# Patient Record
Sex: FEMALE | Race: WHITE | HISPANIC OR LATINO | Employment: OTHER | URBAN - METROPOLITAN AREA
[De-identification: names, ages, dates, MRNs, and addresses within clinical notes are randomized per-mention and may not be internally consistent; named-entity substitution may affect disease eponyms.]

---

## 2019-01-30 ENCOUNTER — HOSPITAL ENCOUNTER (EMERGENCY)
Facility: MEDICAL CENTER | Age: 49
End: 2019-01-30
Attending: EMERGENCY MEDICINE
Payer: MEDICAID

## 2019-01-30 ENCOUNTER — APPOINTMENT (OUTPATIENT)
Dept: RADIOLOGY | Facility: MEDICAL CENTER | Age: 49
End: 2019-01-30
Attending: EMERGENCY MEDICINE
Payer: MEDICAID

## 2019-01-30 VITALS
OXYGEN SATURATION: 98 % | SYSTOLIC BLOOD PRESSURE: 102 MMHG | RESPIRATION RATE: 16 BRPM | WEIGHT: 201.72 LBS | DIASTOLIC BLOOD PRESSURE: 78 MMHG | TEMPERATURE: 97.5 F | HEART RATE: 63 BPM

## 2019-01-30 DIAGNOSIS — K59.00 CONSTIPATION, UNSPECIFIED CONSTIPATION TYPE: ICD-10-CM

## 2019-01-30 DIAGNOSIS — N93.8 DYSFUNCTIONAL UTERINE BLEEDING: ICD-10-CM

## 2019-01-30 DIAGNOSIS — D21.9 FIBROIDS: ICD-10-CM

## 2019-01-30 LAB
ALBUMIN SERPL BCP-MCNC: 4.7 G/DL (ref 3.2–4.9)
ALBUMIN/GLOB SERPL: 1.6 G/DL
ALP SERPL-CCNC: 68 U/L (ref 30–99)
ALT SERPL-CCNC: 41 U/L (ref 2–50)
ANION GAP SERPL CALC-SCNC: 7 MMOL/L (ref 0–11.9)
ANISOCYTOSIS BLD QL SMEAR: ABNORMAL
APPEARANCE UR: CLEAR
AST SERPL-CCNC: 33 U/L (ref 12–45)
BACTERIA #/AREA URNS HPF: NEGATIVE /HPF
BASOPHILS # BLD AUTO: 1.3 % (ref 0–1.8)
BASOPHILS # BLD: 0.08 K/UL (ref 0–0.12)
BILIRUB SERPL-MCNC: 0.3 MG/DL (ref 0.1–1.5)
BILIRUB UR QL STRIP.AUTO: NEGATIVE
BUN SERPL-MCNC: 12 MG/DL (ref 8–22)
CALCIUM SERPL-MCNC: 9.9 MG/DL (ref 8.5–10.5)
CHLORIDE SERPL-SCNC: 105 MMOL/L (ref 96–112)
CO2 SERPL-SCNC: 22 MMOL/L (ref 20–33)
COLOR UR: YELLOW
COMMENT 1642: NORMAL
CREAT SERPL-MCNC: 0.69 MG/DL (ref 0.5–1.4)
EOSINOPHIL # BLD AUTO: 0.19 K/UL (ref 0–0.51)
EOSINOPHIL NFR BLD: 3.2 % (ref 0–6.9)
EPI CELLS #/AREA URNS HPF: NEGATIVE /HPF
ERYTHROCYTE [DISTWIDTH] IN BLOOD BY AUTOMATED COUNT: 47.5 FL (ref 35.9–50)
GLOBULIN SER CALC-MCNC: 2.9 G/DL (ref 1.9–3.5)
GLUCOSE SERPL-MCNC: 93 MG/DL (ref 65–99)
GLUCOSE UR STRIP.AUTO-MCNC: NEGATIVE MG/DL
HCG SERPL QL: NEGATIVE
HCT VFR BLD AUTO: 34 % (ref 37–47)
HGB BLD-MCNC: 9.2 G/DL (ref 12–16)
HYALINE CASTS #/AREA URNS LPF: ABNORMAL /LPF
HYPOCHROMIA BLD QL SMEAR: ABNORMAL
IMM GRANULOCYTES # BLD AUTO: 0.02 K/UL (ref 0–0.11)
IMM GRANULOCYTES NFR BLD AUTO: 0.3 % (ref 0–0.9)
KETONES UR STRIP.AUTO-MCNC: NEGATIVE MG/DL
LEUKOCYTE ESTERASE UR QL STRIP.AUTO: NEGATIVE
LIPASE SERPL-CCNC: 35 U/L (ref 11–82)
LYMPHOCYTES # BLD AUTO: 1.74 K/UL (ref 1–4.8)
LYMPHOCYTES NFR BLD: 29.2 % (ref 22–41)
MCH RBC QN AUTO: 16.5 PG (ref 27–33)
MCHC RBC AUTO-ENTMCNC: 27.1 G/DL (ref 33.6–35)
MCV RBC AUTO: 60.8 FL (ref 81.4–97.8)
MICRO URNS: ABNORMAL
MICROCYTES BLD QL SMEAR: ABNORMAL
MONOCYTES # BLD AUTO: 0.49 K/UL (ref 0–0.85)
MONOCYTES NFR BLD AUTO: 8.2 % (ref 0–13.4)
MORPHOLOGY BLD-IMP: NORMAL
NEUTROPHILS # BLD AUTO: 3.43 K/UL (ref 2–7.15)
NEUTROPHILS NFR BLD: 57.8 % (ref 44–72)
NITRITE UR QL STRIP.AUTO: NEGATIVE
NRBC # BLD AUTO: 0 K/UL
NRBC BLD-RTO: 0 /100 WBC
OVALOCYTES BLD QL SMEAR: NORMAL
PH UR STRIP.AUTO: 6.5 [PH]
PLATELET # BLD AUTO: 381 K/UL (ref 164–446)
PLATELET BLD QL SMEAR: NORMAL
PMV BLD AUTO: 9.2 FL (ref 9–12.9)
POIKILOCYTOSIS BLD QL SMEAR: NORMAL
POLYCHROMASIA BLD QL SMEAR: NORMAL
POTASSIUM SERPL-SCNC: 4.1 MMOL/L (ref 3.6–5.5)
PROT SERPL-MCNC: 7.6 G/DL (ref 6–8.2)
PROT UR QL STRIP: NEGATIVE MG/DL
RBC # BLD AUTO: 5.59 M/UL (ref 4.2–5.4)
RBC # URNS HPF: >150 /HPF
RBC BLD AUTO: PRESENT
RBC UR QL AUTO: ABNORMAL
SCHISTOCYTES BLD QL SMEAR: NORMAL
SODIUM SERPL-SCNC: 134 MMOL/L (ref 135–145)
SP GR UR STRIP.AUTO: 1.02
STOMATOCYTES BLD QL SMEAR: NORMAL
UROBILINOGEN UR STRIP.AUTO-MCNC: 0.2 MG/DL
WBC # BLD AUTO: 6 K/UL (ref 4.8–10.8)
WBC #/AREA URNS HPF: ABNORMAL /HPF

## 2019-01-30 PROCEDURE — 85025 COMPLETE CBC W/AUTO DIFF WBC: CPT

## 2019-01-30 PROCEDURE — 96375 TX/PRO/DX INJ NEW DRUG ADDON: CPT

## 2019-01-30 PROCEDURE — 84703 CHORIONIC GONADOTROPIN ASSAY: CPT

## 2019-01-30 PROCEDURE — 80053 COMPREHEN METABOLIC PANEL: CPT

## 2019-01-30 PROCEDURE — 99285 EMERGENCY DEPT VISIT HI MDM: CPT

## 2019-01-30 PROCEDURE — 76830 TRANSVAGINAL US NON-OB: CPT

## 2019-01-30 PROCEDURE — 700111 HCHG RX REV CODE 636 W/ 250 OVERRIDE (IP): Performed by: EMERGENCY MEDICINE

## 2019-01-30 PROCEDURE — 81001 URINALYSIS AUTO W/SCOPE: CPT

## 2019-01-30 PROCEDURE — 83690 ASSAY OF LIPASE: CPT

## 2019-01-30 PROCEDURE — 96374 THER/PROPH/DIAG INJ IV PUSH: CPT

## 2019-01-30 RX ORDER — ONDANSETRON 2 MG/ML
4 INJECTION INTRAMUSCULAR; INTRAVENOUS ONCE
Status: COMPLETED | OUTPATIENT
Start: 2019-01-30 | End: 2019-01-30

## 2019-01-30 RX ORDER — MORPHINE SULFATE 4 MG/ML
4 INJECTION, SOLUTION INTRAMUSCULAR; INTRAVENOUS
Status: DISCONTINUED | OUTPATIENT
Start: 2019-01-30 | End: 2019-01-31 | Stop reason: HOSPADM

## 2019-01-30 RX ORDER — FERROUS SULFATE 325(65) MG
325 TABLET ORAL DAILY
Qty: 30 TAB | Refills: 0 | Status: SHIPPED | OUTPATIENT
Start: 2019-01-30 | End: 2019-01-30

## 2019-01-30 RX ORDER — FERROUS SULFATE 325(65) MG
325 TABLET ORAL DAILY
Qty: 30 TAB | Refills: 0 | Status: ON HOLD | OUTPATIENT
Start: 2019-01-30 | End: 2019-02-23

## 2019-01-30 RX ORDER — POLYETHYLENE GLYCOL 3350 17 G/17G
17 POWDER, FOR SOLUTION ORAL DAILY
Qty: 30 EACH | Refills: 0 | Status: ON HOLD | OUTPATIENT
Start: 2019-01-30 | End: 2019-02-19

## 2019-01-30 RX ADMIN — ONDANSETRON 4 MG: 2 INJECTION INTRAMUSCULAR; INTRAVENOUS at 19:38

## 2019-01-30 RX ADMIN — MORPHINE SULFATE 4 MG: 4 INJECTION INTRAVENOUS at 19:38

## 2019-01-30 NOTE — LETTER
Emergency Services     January 30, 2019    Patient: Coby Francisco   YOB: 1970   Date of Visit: 1/30/2019       To Whom It May Concern:    Lesia Lopez was accompanying Coby Garcia who was seen and treated in our emergency department on 1/30/2019. She may be excused from school 1/30/19   .    Sincerely,     JORDYN LEONARD M.D.  [facility], EMERGENCY DEPT  Dept: [phone]

## 2019-01-30 NOTE — ED TRIAGE NOTES
Chief Complaint   Patient presents with   • Pelvic Pain     pt states vag bleed for the past 2 wks, blood to be dark with clots. normal menses started 2wks ago and has not stopped bleeding.    • Vaginal Bleeding   • Abdominal Pain     Explained to pt triage process, made pt aware to tell this RN/staff of any changes/concerns, pt verbalized understanding of process and instructions given. Pt to ER [facility].

## 2019-01-31 ENCOUNTER — PATIENT OUTREACH (OUTPATIENT)
Dept: HEALTH INFORMATION MANAGEMENT | Facility: OTHER | Age: 49
End: 2019-01-31

## 2019-01-31 NOTE — ED NOTES
Rechecked vitals. VSS. Tech apologized for wait, patient back in ER Lobby with no new needs. Patient instructed to notify Triage RN or ER Tech of any changes.

## 2019-01-31 NOTE — ED PROVIDER NOTES
ED Provider Note        CHIEF COMPLAINT  Chief Complaint   Patient presents with   • Pelvic Pain     pt states vag bleed for the past 2 wks, blood to be dark with clots. normal menses started 2wks ago and has not stopped bleeding.    • Vaginal Bleeding   • Abdominal Pain       HPI    Coby Francisco is a 48 y.o. female presenting with vaginal bleeding and lower abdominal pain.  States bleeding has been present for the past 2 weeks, started with her normal menstrual period, spotting at that time, progressively worsened over the last 2 days, 5 pads in the last 24 hours, large clots.  Abdominal pain is also progressively worsened over the last 2 weeks, is not on pelvic region/suprapubic, now is 8/10 intensity, crampy in nature, no modifying factors.  Also with nausea yesterday, dysuria.  States she has been constipated however had bowel movement this morning.  Has history of fibroids.    REVIEW OF SYSTEMS  Positives as above. Pertinent negatives include no fevers, vomiting, diarrhea, syncope, chest pain, shortness of breath  All other review of systems are negative        PAST MEDICAL HISTORY   has a past medical history of Uterine fibroid.    SOCIAL HISTORY  Social History     Social History Main Topics   • Smoking status: Current Some Day Smoker   • Smokeless tobacco: Never Used   • Alcohol use Yes      Comment: occ   • Drug use: No   • Sexual activity: Not on file       SURGICAL HISTORY  patient denies any surgical history    CURRENT MEDICATIONS  Home Medications    **Home medications have not yet been reviewed for this encounter**         ALLERGIES  No Known Allergies    PHYSICAL EXAM  VITAL SIGNS: /78   Pulse 63   Temp 36.4 °C (97.5 °F) (Temporal)   Resp 16   Wt 91.5 kg (201 lb 11.5 oz)   SpO2 98%    SpO2: I interpret this pulse oximetry as normal  Constitutional: Alert in no apparent distress.  HENT: Normocephalic atraumatic, MMM  Eyes: PERRL, Conjunctiva normal, Non-icteric.   Neck: Normal  range of motion, No tenderness, Supple, No stridor.   Lymphatic: No lymphadenopathy noted.   Cardiovascular: Regular rate and rhythm, no murmurs.   Thorax & Lungs: Normal breath sounds, No respiratory distress, No wheezing, No chest tenderness.   Abdomen: Soft, mild suprapubic tenderness, No pulsatile masses. No peritoneal signs.  Palpable stool in bilateral lower quadrants.  Pelvic exam: No external lesions noted, no vaginal lacerations or masses or intravaginal lesions noted, faint blood from cervical os, closed os, no CMT or adnexal tenderness  Skin: Warm, Dry, No erythema, No rash.   Back: No bony tenderness, No CVA tenderness.   Extremities: Intact distal pulses, No edema, No tenderness, No cyanosis  Musculoskeletal: Good range of motion in all major joints. No tenderness to palpation or major deformities noted.   Neurologic: Alert and oriented x3, No focal deficits noted.   Psychiatric: Affect normal, Judgment normal, Mood normal.     DIAGNOSTIC STUDIES / PROCEDURES      LABORATORY  Labs Reviewed   CBC WITH DIFFERENTIAL - Abnormal; Notable for the following:        Result Value    RBC 5.59 (*)     Hemoglobin 9.2 (*)     Hematocrit 34.0 (*)     MCV 60.8 (*)     MCH 16.5 (*)     MCHC 27.1 (*)     All other components within normal limits   COMP METABOLIC PANEL - Abnormal; Notable for the following:     Sodium 134 (*)     All other components within normal limits   URINALYSIS,CULTURE IF INDICATED - Abnormal; Notable for the following:     Occult Blood Large (*)     All other components within normal limits   URINE MICROSCOPIC (W/UA) - Abnormal; Notable for the following:     RBC >150 (*)     All other components within normal limits   LIPASE   HCG QUAL SERUM   ESTIMATED GFR   PERIPHERAL SMEAR REVIEW   PLATELET ESTIMATE   MORPHOLOGY   DIFFERENTIAL COMMENT             RADIOLOGY    US-PELVIC TRANSVAGINAL ONLY   Final Result      1.  Markedly enlarged heterogeneous uterus with multiple uterine fibroids as described  above, one of which abuts the endometrial complex. These would probably be better assessed with pelvic MRI on a nonemergent basis.   2.  Markedly thickened endometrial echo complex with no abnormal vascularity. This could be due to underlying blood products, hyperplasia or malignancy.              CHART REVIEW  Pertinent medical chart information was reviewed and reveals: No recent pertinent encounters    Medications   ondansetron (ZOFRAN) syringe/vial injection 4 mg (4 mg Intravenous Given 1/30/19 1938)       COURSE & MEDICAL DECISION MAKING  Pertinent Labs & Imaging studies reviewed. (See chart for details)    Differential diagnosis includes, but not limited to:  Fibroids, dysfunctional uterine bleeding, pregnancy, pregnancy, ectopic pregnancy, ovarian torsion, constipation    Vitals:    01/30/19 1442 01/30/19 1649 01/30/19 2004 01/30/19 2213   BP:  134/81 132/82 102/78   Pulse:  82 80 63   Resp:  16 16 16   Temp:  36.4 °C (97.5 °F)     TempSrc:  Temporal     SpO2:  98%     Weight: 91.5 kg (201 lb 11.5 oz)        48-year-old female with history of fibroids presenting for vaginal bleeding, or abdominal pain.  No infectious history, bleeding appears to be mild, however worsening last couple days.  Is also constipated.  Vital signs and exam reassuring, palpable stool in abdomen, pelvic exam without any concerning findings as cause of bleeding.  Pelvic ultrasound shows fibroids, no other acute process.  Patient is not pregnant.  Labs are otherwise unremarkable, hemoglobin of 9.  Patient without any evidence of hemodynamic instability, asymptomatic anemia.  Pain is likely related to constipation and fibroids, very mild dysfunctional uterine bleeding.  Pain is well controlled here without any medications administered.  Patient did have OB/GYN however is no longer practicing here.  Left message with outpatient  to establish patient at pregnancy center, also given patient pregnancy center contact information..   Will provide constipation treatment and instructions.  Also will start on oral contraceptives, iron supplement..  Patient or guardian given strict returns precautions and care instructions.  Advised PCP follow-up in 1-2 days.  Patient or guardian expresses understanding and agrees to plan.    DISPOSITION  Discharged home in stable condition      FINAL IMPRESSION  Visit Diagnoses     ICD-10-CM   1. Dysfunctional uterine bleeding N93.8   2. Constipation, unspecified constipation type K59.00   3. Fibroids D21.9          Electronically signed by: Sebastian Hamm MD, ISI 1/30/2019     This dictation has been created using voice recognition software and/or scribes. The accuracy of the dictation is limited by the abilities of the software and the expertise of the scribes. I expect there may be some errors of grammar and possibly content. I made every attempt to manually correct the errors within my dictation. However, errors related to voice recognition software and/or scribes may still exist and should be interpreted within the appropriate context.

## 2019-01-31 NOTE — ED NOTES
Pt given discharge instructions and prescriptions, she understands f/u information and recommendations made to her, ambulated with daughter to lobby, all belongings on pt at discharge

## 2019-02-19 ENCOUNTER — HOSPITAL ENCOUNTER (INPATIENT)
Facility: MEDICAL CENTER | Age: 49
LOS: 4 days | DRG: 743 | End: 2019-02-23
Attending: OBSTETRICS & GYNECOLOGY | Admitting: OBSTETRICS & GYNECOLOGY
Payer: MEDICAID

## 2019-02-19 DIAGNOSIS — G89.18 POST-OP PAIN: ICD-10-CM

## 2019-02-19 LAB
ABO GROUP BLD: NORMAL
ABO GROUP BLD: NORMAL
ANISOCYTOSIS BLD QL SMEAR: NORMAL
APTT PPP: 24 SEC (ref 24.7–36)
BARCODED ABORH UBTYP: 5100
BARCODED PRD CODE UBPRD: NORMAL
BARCODED UNIT NUM UBUNT: NORMAL
BLD GP AB SCN SERPL QL: NORMAL
COMPONENT R 8504R: NORMAL
EKG IMPRESSION: NORMAL
HCG SERPL QL: NEGATIVE
HCT VFR BLD AUTO: 17.6 % (ref 37–47)
HCT VFR BLD AUTO: 25.9 % (ref 37–47)
HGB BLD-MCNC: 4.6 G/DL (ref 12–16)
HGB BLD-MCNC: 7.6 G/DL (ref 12–16)
HYPOCHROMIA BLD QL SMEAR: NORMAL
INR PPP: 1.01 (ref 0.87–1.13)
MCH RBC QN AUTO: 19.6 PG (ref 27–33)
MCH RBC QN AUTO: 23.1 PG (ref 27–33)
MCHC RBC AUTO-ENTMCNC: 26.1 G/DL (ref 33.6–35)
MCHC RBC AUTO-ENTMCNC: 29 G/DL (ref 33.6–35)
MCV RBC AUTO: 74.9 FL (ref 81.4–97.8)
MCV RBC AUTO: 79.7 FL (ref 81.4–97.8)
MEDICATIONS NOTED 1688: NORMAL
MICROCYTES BLD QL SMEAR: NORMAL
MORPHOLOGY BLD-IMP: NORMAL
MORPHOLOGY BLD-IMP: NORMAL
OVALOCYTES BLD QL SMEAR: NORMAL
PLATELET # BLD AUTO: 285 K/UL (ref 164–446)
PLATELET # BLD AUTO: 331 K/UL (ref 164–446)
PLATELET BLD QL SMEAR: NORMAL
PLT FUNCTION COL/EPI  1661: 143 SEC (ref 83–170)
PMV BLD AUTO: 9.4 FL (ref 9–12.9)
PMV BLD AUTO: 9.7 FL (ref 9–12.9)
POIKILOCYTOSIS BLD QL SMEAR: NORMAL
POLYCHROMASIA BLD QL SMEAR: NORMAL
PRODUCT TYPE UPROD: NORMAL
PROTHROMBIN TIME: 13.5 SEC (ref 12–14.6)
RBC # BLD AUTO: 2.35 M/UL (ref 4.2–5.4)
RBC # BLD AUTO: 3.25 M/UL (ref 4.2–5.4)
RBC BLD AUTO: PRESENT
RH BLD: NORMAL
RH BLD: NORMAL
UNIT STATUS USTAT: NORMAL
WBC # BLD AUTO: 5 K/UL (ref 4.8–10.8)
WBC # BLD AUTO: 5.5 K/UL (ref 4.8–10.8)

## 2019-02-19 PROCEDURE — 700101 HCHG RX REV CODE 250

## 2019-02-19 PROCEDURE — 86850 RBC ANTIBODY SCREEN: CPT

## 2019-02-19 PROCEDURE — 36415 COLL VENOUS BLD VENIPUNCTURE: CPT

## 2019-02-19 PROCEDURE — 93005 ELECTROCARDIOGRAM TRACING: CPT | Performed by: OBSTETRICS & GYNECOLOGY

## 2019-02-19 PROCEDURE — 700105 HCHG RX REV CODE 258: Performed by: OBSTETRICS & GYNECOLOGY

## 2019-02-19 PROCEDURE — 86901 BLOOD TYPING SEROLOGIC RH(D): CPT

## 2019-02-19 PROCEDURE — 36430 TRANSFUSION BLD/BLD COMPNT: CPT

## 2019-02-19 PROCEDURE — 700111 HCHG RX REV CODE 636 W/ 250 OVERRIDE (IP)

## 2019-02-19 PROCEDURE — 700105 HCHG RX REV CODE 258

## 2019-02-19 PROCEDURE — 85610 PROTHROMBIN TIME: CPT

## 2019-02-19 PROCEDURE — A9270 NON-COVERED ITEM OR SERVICE: HCPCS | Performed by: ANESTHESIOLOGY

## 2019-02-19 PROCEDURE — 86923 COMPATIBILITY TEST ELECTRIC: CPT | Mod: 91

## 2019-02-19 PROCEDURE — 85576 BLOOD PLATELET AGGREGATION: CPT

## 2019-02-19 PROCEDURE — P9016 RBC LEUKOCYTES REDUCED: HCPCS | Mod: 91

## 2019-02-19 PROCEDURE — 700111 HCHG RX REV CODE 636 W/ 250 OVERRIDE (IP): Performed by: OBSTETRICS & GYNECOLOGY

## 2019-02-19 PROCEDURE — 85730 THROMBOPLASTIN TIME PARTIAL: CPT

## 2019-02-19 PROCEDURE — 85027 COMPLETE CBC AUTOMATED: CPT

## 2019-02-19 PROCEDURE — 700102 HCHG RX REV CODE 250 W/ 637 OVERRIDE(OP): Performed by: ANESTHESIOLOGY

## 2019-02-19 PROCEDURE — 30233N1 TRANSFUSION OF NONAUTOLOGOUS RED BLOOD CELLS INTO PERIPHERAL VEIN, PERCUTANEOUS APPROACH: ICD-10-PCS | Performed by: OBSTETRICS & GYNECOLOGY

## 2019-02-19 PROCEDURE — A9270 NON-COVERED ITEM OR SERVICE: HCPCS | Performed by: OBSTETRICS & GYNECOLOGY

## 2019-02-19 PROCEDURE — 86900 BLOOD TYPING SEROLOGIC ABO: CPT

## 2019-02-19 PROCEDURE — 84703 CHORIONIC GONADOTROPIN ASSAY: CPT

## 2019-02-19 PROCEDURE — 93010 ELECTROCARDIOGRAM REPORT: CPT | Performed by: INTERNAL MEDICINE

## 2019-02-19 PROCEDURE — 770006 HCHG ROOM/CARE - MED/SURG/GYN SEMI*

## 2019-02-19 PROCEDURE — 700102 HCHG RX REV CODE 250 W/ 637 OVERRIDE(OP): Performed by: OBSTETRICS & GYNECOLOGY

## 2019-02-19 RX ORDER — ONDANSETRON 2 MG/ML
4 INJECTION INTRAMUSCULAR; INTRAVENOUS EVERY 4 HOURS PRN
Status: DISCONTINUED | OUTPATIENT
Start: 2019-02-19 | End: 2019-02-20

## 2019-02-19 RX ORDER — ACETAMINOPHEN 500 MG
1000 TABLET ORAL ONCE
Status: COMPLETED | OUTPATIENT
Start: 2019-02-19 | End: 2019-02-19

## 2019-02-19 RX ORDER — CELECOXIB 200 MG/1
400 CAPSULE ORAL ONCE
Status: COMPLETED | OUTPATIENT
Start: 2019-02-19 | End: 2019-02-19

## 2019-02-19 RX ORDER — SODIUM CHLORIDE 9 MG/ML
INJECTION, SOLUTION INTRAVENOUS CONTINUOUS
Status: DISCONTINUED | OUTPATIENT
Start: 2019-02-20 | End: 2019-02-21

## 2019-02-19 RX ORDER — OXYCODONE HCL 10 MG/1
10 TABLET, FILM COATED, EXTENDED RELEASE ORAL ONCE
Status: COMPLETED | OUTPATIENT
Start: 2019-02-19 | End: 2019-02-19

## 2019-02-19 RX ORDER — SODIUM CHLORIDE, SODIUM LACTATE, POTASSIUM CHLORIDE, CALCIUM CHLORIDE 600; 310; 30; 20 MG/100ML; MG/100ML; MG/100ML; MG/100ML
INJECTION, SOLUTION INTRAVENOUS ONCE
Status: COMPLETED | OUTPATIENT
Start: 2019-02-19 | End: 2019-02-19

## 2019-02-19 RX ORDER — SODIUM CHLORIDE 9 MG/ML
INJECTION, SOLUTION INTRAVENOUS
Status: COMPLETED
Start: 2019-02-19 | End: 2019-02-19

## 2019-02-19 RX ORDER — ACETAMINOPHEN 325 MG/1
650 TABLET ORAL EVERY 6 HOURS PRN
Status: DISCONTINUED | OUTPATIENT
Start: 2019-02-19 | End: 2019-02-20

## 2019-02-19 RX ORDER — SODIUM CHLORIDE 9 MG/ML
1000 INJECTION, SOLUTION INTRAVENOUS
Status: ACTIVE | OUTPATIENT
Start: 2019-02-19 | End: 2019-02-20

## 2019-02-19 RX ORDER — MORPHINE SULFATE 4 MG/ML
2 INJECTION, SOLUTION INTRAMUSCULAR; INTRAVENOUS
Status: DISCONTINUED | OUTPATIENT
Start: 2019-02-19 | End: 2019-02-21

## 2019-02-19 RX ORDER — SODIUM CHLORIDE 9 MG/ML
INJECTION, SOLUTION INTRAVENOUS CONTINUOUS
Status: ACTIVE | OUTPATIENT
Start: 2019-02-19 | End: 2019-02-20

## 2019-02-19 RX ORDER — GABAPENTIN 300 MG/1
300 CAPSULE ORAL
Status: COMPLETED | OUTPATIENT
Start: 2019-02-19 | End: 2019-02-19

## 2019-02-19 RX ADMIN — SODIUM CHLORIDE, SODIUM LACTATE, POTASSIUM CHLORIDE, CALCIUM CHLORIDE 1000 ML: 600; 310; 30; 20 INJECTION, SOLUTION INTRAVENOUS at 10:41

## 2019-02-19 RX ADMIN — OXYCODONE HYDROCHLORIDE 10 MG: 10 TABLET, FILM COATED, EXTENDED RELEASE ORAL at 10:41

## 2019-02-19 RX ADMIN — ACETAMINOPHEN 650 MG: 325 TABLET, FILM COATED ORAL at 18:32

## 2019-02-19 RX ADMIN — GABAPENTIN 300 MG: 300 CAPSULE ORAL at 10:41

## 2019-02-19 RX ADMIN — CELECOXIB 400 MG: 200 CAPSULE ORAL at 10:41

## 2019-02-19 RX ADMIN — ONDANSETRON 4 MG: 2 INJECTION INTRAMUSCULAR; INTRAVENOUS at 18:04

## 2019-02-19 RX ADMIN — SODIUM CHLORIDE 500 ML: 9 INJECTION, SOLUTION INTRAVENOUS at 18:01

## 2019-02-19 RX ADMIN — ACETAMINOPHEN 1000 MG: 500 TABLET, FILM COATED ORAL at 10:41

## 2019-02-19 ASSESSMENT — COGNITIVE AND FUNCTIONAL STATUS - GENERAL
SUGGESTED CMS G CODE MODIFIER DAILY ACTIVITY: CH
DAILY ACTIVITIY SCORE: 24
SUGGESTED CMS G CODE MODIFIER MOBILITY: CH
MOBILITY SCORE: 24

## 2019-02-19 ASSESSMENT — PATIENT HEALTH QUESTIONNAIRE - PHQ9
2. FEELING DOWN, DEPRESSED, IRRITABLE, OR HOPELESS: NOT AT ALL
1. LITTLE INTEREST OR PLEASURE IN DOING THINGS: NOT AT ALL
SUM OF ALL RESPONSES TO PHQ9 QUESTIONS 1 AND 2: 0

## 2019-02-19 ASSESSMENT — LIFESTYLE VARIABLES
HAVE YOU EVER FELT YOU SHOULD CUT DOWN ON YOUR DRINKING: NO
CONSUMPTION TOTAL: NEGATIVE
TOTAL SCORE: 0
EVER_SMOKED: NEVER
EVER HAD A DRINK FIRST THING IN THE MORNING TO STEADY YOUR NERVES TO GET RID OF A HANGOVER: NO
TOTAL SCORE: 0
HAVE PEOPLE ANNOYED YOU BY CRITICIZING YOUR DRINKING: NO
TOTAL SCORE: 0
ALCOHOL_USE: YES
EVER FELT BAD OR GUILTY ABOUT YOUR DRINKING: NO
AVERAGE NUMBER OF DAYS PER WEEK YOU HAVE A DRINK CONTAINING ALCOHOL: 1
ON A TYPICAL DAY WHEN YOU DRINK ALCOHOL HOW MANY DRINKS DO YOU HAVE: 1
HOW MANY TIMES IN THE PAST YEAR HAVE YOU HAD 5 OR MORE DRINKS IN A DAY: 0

## 2019-02-19 NOTE — H&P
DATE OF PROCEDURE:  2019    IDENTIFICATION:  This is a 48-year-old [facility] female,  1, para 0.    HISTORY OF PRESENT ILLNESS:  This is a patient who was initially seen by me in   my office on 2019.  At that time, she presented complaining of vaginal   bleeding and known uterine fibroids.  She apparently has a previous history of   known uterine fibroids and was followed by Dr. Percy Guan, was actually   scheduled for hysterectomy back in .  Secondary to the social situation at   that time, she canceled the surgery and did not have followup.  She was   having heavy, but monthly menses until January, at which time she presented to   the emergency department on  after having bled for 3-4 weeks fairly   heavy.  At that time, her hemoglobin was stable at 9.2.  Ultrasound revealed   an enlarged fibroid uterus measuring [id number] with an additional probable 5 cm   fibroid with multiple fibroids throughout the uterus.  She was started on   Prempro by the emergency room physician and discharged home.  She called and   scheduled an initial consultation appointment with me, which again she was   seen on , at which time she was taking the Prempro.  She was continuing   to bleed.  She was denying lightheadedness, dizziness, syncope or near   syncopal episodes at that time.  On that day, exam in the office revealed a   significantly enlarged, 18-week size, irregular, mobile, nontender uterus.  An   endometrial biopsy was performed with scant tissue removed.  She was sent on   that day for a CBC.  Her hemoglobin returned at 7.4.  At that time, she was   counseled and she understood that she was in need of hysterectomy.    OBSTETRICAL HISTORY:  Significant for 1 term vaginal delivery without   complications.    GYNECOLOGIC HISTORY:  Significant for this known history of uterine fibroids,   menorrhagia, irregular menses.  She denies vaginal dryness, hot flashes, mood   swings, urinary  incontinence, frequency or urgency.    PAST MEDICAL HISTORY:  Significant only for asthma.    PAST SURGICAL HISTORY:  Hemorrhoidectomy in 2009.    CURRENT MEDICATIONS:  Include Prempro.    ALLERGIES:  No known drug allergies.    SOCIAL HISTORY:  She is not currently sexually active.  She does not smoke.    She is a minimal social alcohol user.  Denies illicit drug use or frequent use   of narcotic pain medication.    FAMILY HISTORY:  Significant for cancer in her father.    REVIEW OF SYSTEMS:  Performed on her preoperative visit on 02/15.  At that   time, she denied lightheadedness, dizziness or near syncope, but does complain   of fatigue and weakness.  She denies fever, chills, sweats.  She denies   urinary incontinence, frequency, urgency, pelvic pain.  CARDIOVASCULAR:  She denies chest pain, shortness of breath, palpitations,   wheezing.  GASTROENTEROLOGY:  She denies nausea, vomiting, diarrhea.  Complains of   occasional constipation and abdominal pain related.  ENDOCRINE:  She denies cold or heat intolerance or unusual weight change.  BREASTS:  She denies breast lump, nipple discharge, abnormal mammogram.  She denies back pain, joint pain, joint swelling.  She denies anxiety, has   occasional depression, denies suicidal ideation.  She denies abnormal   bruising, bleeding or enlarged lymph nodes.    PHYSICAL EXAMINATION:  VITAL SIGNS:  She is 65 inches tall, weight is 198 pounds, giving us a BMI of   33, blood pressure is 122/72.  Urinalysis is negative.  HEENT:  Within normal limits.  NECK:  Supple without lymphadenopathy or thyromegaly.  LUNGS:  Clear to auscultation bilaterally.  HEART:  Regular rate and rhythm without murmur, rub or gallop.  ABDOMEN:  Soft.  Palpable uterus approximately 2-3 cm below the umbilicus.    Mild tenderness to deep palpation.  No rebound, no guarding.  PELVIC:  External genitalia, Bartholin, urethral and [place] glands within   normal limits.  Vagina is pink and moist without gross  lesions.  Cervix is   parous, but closed.  There is no cervical motion tenderness.  Uterus is   enlarged significantly up to approximately an 18-week size, irregular to   palpation, mobile.  Adnexa, unable to elicit adnexal masses, but a very   difficult exam secondary to the enlarged, irregularly shaped uterus.  RECTAL:  Deferred.  EXTREMITIES:  Show no clubbing, cyanosis or edema, 2+ peripheral pulses, 2+   deep tendon reflexes.    LABORATORY AND IMAGING DATA:  Labs again of significance, most recent   hemoglobin on 02/13 is 7.4 with a hematocrit of 24.5 and pelvic ultrasound   shows an enlarged fibroid uterus [id number] with an additional 5 cm mass towards   the fundal region.  Endometrial biopsy shows scant endometrial tissue   negative for dysplasia or malignancy.    DISCUSSION:  Thorough discussion at her visit on 02/15 concerning this   enlarged uterus with obvious fibroids, benign pathology, thus far evaluated   significant anemia and bleeding with a hemoglobin of 7.4 and recommendation   for surgical removal of which the patient agrees and desires as soon as   possible.  Methods of hysterectomy discussed including abdominal versus   laparoscopic vaginal and morcellation because of the large and irregular   uterus.  I feel this surgery can only be performed through an abdominal   incision.  We discussed that I will make a decision on the skin incision on   the abdomen after exam under anesthesia, but understand it might be a   Pfannenstiel incision versus a midline vertical incision.  We discussed   removal of the uterus in its entirety.  We also discussed possibility we may   have to perform simply a supracervical hysterectomy depending on the location   of the fibroids and ability to remove safely.  We discussed pros and cons of   oophorectomy including risk of future development of ovarian cysts and ovarian   cancer versus risks of removal with immediate surgical menopause and the   inherent risks of this.   Unless gross abnormality visualized, patient desires   her ovaries to remain intact, and therefore, we will remove the uterus and   bilateral tubes.  Therefore, the specific risks, benefits and alternatives of   surgical procedure itself discussed in detail including the risks of   anesthesia, which include the risk of death, risk of injury to other   intraabdominal and pelvic organs including, but not limited to bowel, bladder,   blood vessels, ureters and nerves all requiring further surgical repair; if   delayed diagnosis, possible permanent organ damage, risk of hemorrhage   requiring transfusion and her low hemoglobin to start very likely that she   will need a blood transfusion, she consents to this after risks, benefits,   alternatives and has been typed and crossed, risk of injury to bowel requiring   repair up to and including permanent colostomy, risk of bladder and ureteral   injuries requiring repair up to and including permanent catheterization   reimplantation, risk of nerve damage causing chronic pain syndromes and loss   of limb mobility.  She understands the vagina will be a blind pouch if the   cervix is removed.  Risk of dyspareunia postop is also discussed.    Postoperative risks including wound infection, wound dehiscence, wound   separation, thromboembolic events and pneumonia are all discussed.  Again,   risk of requirement for blood transfusion, which is significantly high   discussed and patient verbalizes an understanding and agrees to blood   transfusion intraoperatively and again postoperatively if necessary.  She   understands she will no longer be able to carry a pregnancy.  She understands   she will no longer have normal menstrual periods.  Risk of development of   pelvic prolapse in the future discussed.  All of her questions are answered.    Surgical consents were signed in the office.  We discussed the use of narcotic   pain medication postoperatively.  I have checked  aware I  have in   relationship with the patient.  I see no increased risk factors and she has   read and initialed and signed a narcotic use consent in the office.  At this   time, all questions were answered and she agrees to proceed as recommended and   scheduled for 02/19.    ASSESSMENT:  1.  Symptomatic uterine fibroids.  2.  Symptomatic anemia.    PLAN:  Patient to be admitted in the morning on 02/19/2019.  Plan for exam   under anesthesia followed by total abdominal hysterectomy, bilateral   salpingectomy on 02/19 at [facility] in [place], [place].       ____________________________________     MD MIRANDA Valle / CARLOTTA    DD:  02/18/2019 21:50:29  DT:  02/18/2019 23:13:53    D#:  [id number]  Job#:  [id number]

## 2019-02-19 NOTE — OR NURSING
"Pt was OOB to restroom prior to second transfusion. Peripad had a small amount of blood noted and changed out. Pt has tolerated some juice and ice cream. Second unit of blood is transfusing. Pt is tolerating this well. No signs or symptoms of a transfusion reactions noted. Pt denies shortness of brath, lungs sound clear. Pt states she is tired, but feels \"better\" than she did when she came in. Pt states pain is tolerable 2/ 10 abd. Will continue to monitor.  "

## 2019-02-19 NOTE — OR NURSING
1230 Assisting at bedside with care. Pt's ID band verified, blood type verified, and RBCs verified against blood bank paper documentation of the RBC unit. All documentation reviewed with pt and Dr. Kang. Pt feels tired and weak. Transfusion started and RN remained at bedside. Pt was free of signs or symptoms of a blood transfusion reaction.  Rate was increased, see transfusion documentation. Pt tolerated this well. Blood transfusion complete at 1350. Second unit of blood released in EMR, blood bank called, awaiting RBC.

## 2019-02-20 LAB
BASOPHILS # BLD AUTO: 0.9 % (ref 0–1.8)
BASOPHILS # BLD: 0.05 K/UL (ref 0–0.12)
EOSINOPHIL # BLD AUTO: 0.09 K/UL (ref 0–0.51)
EOSINOPHIL NFR BLD: 1.7 % (ref 0–6.9)
ERYTHROCYTE [DISTWIDTH] IN BLOOD BY AUTOMATED COUNT: 72.8 FL (ref 35.9–50)
HCT VFR BLD AUTO: 30 % (ref 37–47)
HGB BLD-MCNC: 9.2 G/DL (ref 12–16)
IMM GRANULOCYTES # BLD AUTO: 0.01 K/UL (ref 0–0.11)
IMM GRANULOCYTES NFR BLD AUTO: 0.2 % (ref 0–0.9)
LYMPHOCYTES # BLD AUTO: 1.63 K/UL (ref 1–4.8)
LYMPHOCYTES NFR BLD: 30.2 % (ref 22–41)
MCH RBC QN AUTO: 24.9 PG (ref 27–33)
MCHC RBC AUTO-ENTMCNC: 30.7 G/DL (ref 33.6–35)
MCV RBC AUTO: 81.1 FL (ref 81.4–97.8)
MONOCYTES # BLD AUTO: 0.41 K/UL (ref 0–0.85)
MONOCYTES NFR BLD AUTO: 7.6 % (ref 0–13.4)
MORPHOLOGY BLD-IMP: NORMAL
NEUTROPHILS # BLD AUTO: 3.21 K/UL (ref 2–7.15)
NEUTROPHILS NFR BLD: 59.4 % (ref 44–72)
NRBC # BLD AUTO: 0.02 K/UL
NRBC BLD-RTO: 0.4 /100 WBC
PATHOLOGY CONSULT NOTE: NORMAL
PLATELET # BLD AUTO: 256 K/UL (ref 164–446)
PMV BLD AUTO: 9.1 FL (ref 9–12.9)
RBC # BLD AUTO: 3.7 M/UL (ref 4.2–5.4)
WBC # BLD AUTO: 5.4 K/UL (ref 4.8–10.8)

## 2019-02-20 PROCEDURE — 160048 HCHG OR STATISTICAL LEVEL 1-5: Performed by: OBSTETRICS & GYNECOLOGY

## 2019-02-20 PROCEDURE — A9270 NON-COVERED ITEM OR SERVICE: HCPCS | Performed by: OBSTETRICS & GYNECOLOGY

## 2019-02-20 PROCEDURE — 700105 HCHG RX REV CODE 258: Performed by: OBSTETRICS & GYNECOLOGY

## 2019-02-20 PROCEDURE — 0UT90ZZ RESECTION OF UTERUS, OPEN APPROACH: ICD-10-PCS | Performed by: OBSTETRICS & GYNECOLOGY

## 2019-02-20 PROCEDURE — 88311 DECALCIFY TISSUE: CPT

## 2019-02-20 PROCEDURE — 700101 HCHG RX REV CODE 250: Performed by: OBSTETRICS & GYNECOLOGY

## 2019-02-20 PROCEDURE — 160036 HCHG PACU - EA ADDL 30 MINS PHASE I: Performed by: OBSTETRICS & GYNECOLOGY

## 2019-02-20 PROCEDURE — 302129 PCA PLUS: Performed by: OBSTETRICS & GYNECOLOGY

## 2019-02-20 PROCEDURE — 700102 HCHG RX REV CODE 250 W/ 637 OVERRIDE(OP): Performed by: ANESTHESIOLOGY

## 2019-02-20 PROCEDURE — 700101 HCHG RX REV CODE 250

## 2019-02-20 PROCEDURE — 160035 HCHG PACU - 1ST 60 MINS PHASE I: Performed by: OBSTETRICS & GYNECOLOGY

## 2019-02-20 PROCEDURE — 700111 HCHG RX REV CODE 636 W/ 250 OVERRIDE (IP)

## 2019-02-20 PROCEDURE — 700111 HCHG RX REV CODE 636 W/ 250 OVERRIDE (IP): Performed by: OBSTETRICS & GYNECOLOGY

## 2019-02-20 PROCEDURE — 0UT70ZZ RESECTION OF BILATERAL FALLOPIAN TUBES, OPEN APPROACH: ICD-10-PCS | Performed by: OBSTETRICS & GYNECOLOGY

## 2019-02-20 PROCEDURE — 160041 HCHG SURGERY MINUTES - EA ADDL 1 MIN LEVEL 4: Performed by: OBSTETRICS & GYNECOLOGY

## 2019-02-20 PROCEDURE — 501838 HCHG SUTURE GENERAL: Performed by: OBSTETRICS & GYNECOLOGY

## 2019-02-20 PROCEDURE — 88307 TISSUE EXAM BY PATHOLOGIST: CPT

## 2019-02-20 PROCEDURE — 85025 COMPLETE CBC W/AUTO DIFF WBC: CPT

## 2019-02-20 PROCEDURE — A9270 NON-COVERED ITEM OR SERVICE: HCPCS | Performed by: ANESTHESIOLOGY

## 2019-02-20 PROCEDURE — 36415 COLL VENOUS BLD VENIPUNCTURE: CPT

## 2019-02-20 PROCEDURE — 770006 HCHG ROOM/CARE - MED/SURG/GYN SEMI*

## 2019-02-20 PROCEDURE — 160002 HCHG RECOVERY MINUTES (STAT): Performed by: OBSTETRICS & GYNECOLOGY

## 2019-02-20 PROCEDURE — 700111 HCHG RX REV CODE 636 W/ 250 OVERRIDE (IP): Performed by: ANESTHESIOLOGY

## 2019-02-20 PROCEDURE — 502704 HCHG DEVICE, LIGASURE IMPACT: Performed by: OBSTETRICS & GYNECOLOGY

## 2019-02-20 PROCEDURE — 501445 HCHG STAPLER, SKIN DISP: Performed by: OBSTETRICS & GYNECOLOGY

## 2019-02-20 PROCEDURE — 160029 HCHG SURGERY MINUTES - 1ST 30 MINS LEVEL 4: Performed by: OBSTETRICS & GYNECOLOGY

## 2019-02-20 PROCEDURE — 700102 HCHG RX REV CODE 250 W/ 637 OVERRIDE(OP): Performed by: OBSTETRICS & GYNECOLOGY

## 2019-02-20 PROCEDURE — A4314 CATH W/DRAINAGE 2-WAY LATEX: HCPCS | Performed by: OBSTETRICS & GYNECOLOGY

## 2019-02-20 PROCEDURE — 160009 HCHG ANES TIME/MIN: Performed by: OBSTETRICS & GYNECOLOGY

## 2019-02-20 PROCEDURE — A6404 STERILE GAUZE > 48 SQ IN: HCPCS | Performed by: OBSTETRICS & GYNECOLOGY

## 2019-02-20 RX ORDER — KETOROLAC TROMETHAMINE 30 MG/ML
30 INJECTION, SOLUTION INTRAMUSCULAR; INTRAVENOUS EVERY 6 HOURS
Status: DISPENSED | OUTPATIENT
Start: 2019-02-20 | End: 2019-02-23

## 2019-02-20 RX ORDER — DIPHENHYDRAMINE HYDROCHLORIDE 50 MG/ML
12.5 INJECTION INTRAMUSCULAR; INTRAVENOUS
Status: DISCONTINUED | OUTPATIENT
Start: 2019-02-20 | End: 2019-02-20 | Stop reason: HOSPADM

## 2019-02-20 RX ORDER — ACETAMINOPHEN 500 MG
1000 TABLET ORAL EVERY 6 HOURS
Status: DISCONTINUED | OUTPATIENT
Start: 2019-02-20 | End: 2019-02-23 | Stop reason: HOSPADM

## 2019-02-20 RX ORDER — OXYCODONE HCL 5 MG/5 ML
5 SOLUTION, ORAL ORAL
Status: COMPLETED | OUTPATIENT
Start: 2019-02-20 | End: 2019-02-20

## 2019-02-20 RX ORDER — ONDANSETRON 2 MG/ML
4 INJECTION INTRAMUSCULAR; INTRAVENOUS
Status: COMPLETED | OUTPATIENT
Start: 2019-02-20 | End: 2019-02-20

## 2019-02-20 RX ORDER — MEPERIDINE HYDROCHLORIDE 25 MG/ML
6.25 INJECTION INTRAMUSCULAR; INTRAVENOUS; SUBCUTANEOUS
Status: DISCONTINUED | OUTPATIENT
Start: 2019-02-20 | End: 2019-02-20 | Stop reason: HOSPADM

## 2019-02-20 RX ORDER — DEXTROSE MONOHYDRATE, SODIUM CHLORIDE, AND POTASSIUM CHLORIDE 50; 1.49; 4.5 G/1000ML; G/1000ML; G/1000ML
INJECTION, SOLUTION INTRAVENOUS CONTINUOUS
Status: DISCONTINUED | OUTPATIENT
Start: 2019-02-20 | End: 2019-02-21

## 2019-02-20 RX ORDER — OXYCODONE HCL 5 MG/5 ML
10 SOLUTION, ORAL ORAL
Status: COMPLETED | OUTPATIENT
Start: 2019-02-20 | End: 2019-02-20

## 2019-02-20 RX ORDER — ACETAMINOPHEN 500 MG
1000 TABLET ORAL ONCE
Status: DISCONTINUED | OUTPATIENT
Start: 2019-02-20 | End: 2019-02-20

## 2019-02-20 RX ORDER — SODIUM CHLORIDE, SODIUM LACTATE, POTASSIUM CHLORIDE, CALCIUM CHLORIDE 600; 310; 30; 20 MG/100ML; MG/100ML; MG/100ML; MG/100ML
INJECTION, SOLUTION INTRAVENOUS EVERY 6 HOURS
Status: DISCONTINUED | OUTPATIENT
Start: 2019-02-20 | End: 2019-02-20

## 2019-02-20 RX ORDER — SODIUM CHLORIDE, SODIUM LACTATE, POTASSIUM CHLORIDE, CALCIUM CHLORIDE 600; 310; 30; 20 MG/100ML; MG/100ML; MG/100ML; MG/100ML
INJECTION, SOLUTION INTRAVENOUS CONTINUOUS
Status: DISCONTINUED | OUTPATIENT
Start: 2019-02-20 | End: 2019-02-20 | Stop reason: HOSPADM

## 2019-02-20 RX ORDER — FAMOTIDINE 20 MG/1
20 TABLET, FILM COATED ORAL EVERY 12 HOURS
Status: DISCONTINUED | OUTPATIENT
Start: 2019-02-20 | End: 2019-02-23 | Stop reason: HOSPADM

## 2019-02-20 RX ORDER — HALOPERIDOL 5 MG/ML
1 INJECTION INTRAMUSCULAR
Status: DISCONTINUED | OUTPATIENT
Start: 2019-02-20 | End: 2019-02-20 | Stop reason: HOSPADM

## 2019-02-20 RX ORDER — ONDANSETRON 2 MG/ML
4 INJECTION INTRAMUSCULAR; INTRAVENOUS EVERY 6 HOURS PRN
Status: DISCONTINUED | OUTPATIENT
Start: 2019-02-20 | End: 2019-02-23 | Stop reason: HOSPADM

## 2019-02-20 RX ORDER — GABAPENTIN 300 MG/1
300 CAPSULE ORAL ONCE
Status: DISCONTINUED | OUTPATIENT
Start: 2019-02-20 | End: 2019-02-20 | Stop reason: HOSPADM

## 2019-02-20 RX ADMIN — DIPHENHYDRAMINE HYDROCHLORIDE 12.5 MG: 50 INJECTION INTRAMUSCULAR; INTRAVENOUS at 10:20

## 2019-02-20 RX ADMIN — FAMOTIDINE 20 MG: 20 TABLET ORAL at 17:57

## 2019-02-20 RX ADMIN — MORPHINE SULFATE: 50 INJECTION, SOLUTION, CONCENTRATE INTRAVENOUS at 17:54

## 2019-02-20 RX ADMIN — MORPHINE SULFATE: 50 INJECTION, SOLUTION, CONCENTRATE INTRAVENOUS at 11:51

## 2019-02-20 RX ADMIN — FENTANYL CITRATE 50 MCG: 50 INJECTION, SOLUTION INTRAMUSCULAR; INTRAVENOUS at 10:06

## 2019-02-20 RX ADMIN — ACETAMINOPHEN 1000 MG: 500 TABLET ORAL at 11:44

## 2019-02-20 RX ADMIN — FENTANYL CITRATE 50 MCG: 50 INJECTION, SOLUTION INTRAMUSCULAR; INTRAVENOUS at 10:08

## 2019-02-20 RX ADMIN — POTASSIUM CHLORIDE, DEXTROSE MONOHYDRATE AND SODIUM CHLORIDE: 150; 5; 450 INJECTION, SOLUTION INTRAVENOUS at 17:58

## 2019-02-20 RX ADMIN — ACETAMINOPHEN 1000 MG: 500 TABLET ORAL at 17:57

## 2019-02-20 RX ADMIN — KETOROLAC TROMETHAMINE 30 MG: 30 INJECTION, SOLUTION INTRAMUSCULAR at 11:44

## 2019-02-20 RX ADMIN — KETOROLAC TROMETHAMINE 30 MG: 30 INJECTION, SOLUTION INTRAMUSCULAR at 18:00

## 2019-02-20 RX ADMIN — FAMOTIDINE 20 MG: 20 TABLET ORAL at 11:45

## 2019-02-20 RX ADMIN — ONDANSETRON 4 MG: 2 INJECTION INTRAMUSCULAR; INTRAVENOUS at 10:17

## 2019-02-20 RX ADMIN — SODIUM CHLORIDE, SODIUM LACTATE, POTASSIUM CHLORIDE, CALCIUM CHLORIDE: 600; 310; 30; 20 INJECTION, SOLUTION INTRAVENOUS at 10:15

## 2019-02-20 RX ADMIN — OXYCODONE HYDROCHLORIDE 10 MG: 5 SOLUTION ORAL at 10:10

## 2019-02-20 RX ADMIN — SODIUM CHLORIDE: 9 INJECTION, SOLUTION INTRAVENOUS at 01:12

## 2019-02-20 RX ADMIN — POTASSIUM CHLORIDE, DEXTROSE MONOHYDRATE AND SODIUM CHLORIDE: 150; 5; 450 INJECTION, SOLUTION INTRAVENOUS at 11:43

## 2019-02-20 NOTE — CARE PLAN
Problem: Safety  Goal: Will remain free from injury  Updated about POC. Reinforce call light use. Pt acknowledged understanding    Problem: Knowledge Deficit  Goal: Knowledge of disease process/condition, treatment plan, diagnostic tests, and medications will improve  Sx in AM. Pt aware of plans. Recheck cbc after blood transfused.

## 2019-02-20 NOTE — PROGRESS NOTES
S- Pt feeling well this am  No further nausea  Minimal cramping  No lightheadedness or dizziness.  O- Afebrile VSS & WNL  Abdomen: soft, palpable mass below umbulicus  H&H (after 4 units)= 9.2 & 30  A- SUF  S/P 4 units PRBCs  P- proceed with VIR[place]IE and bilateral salpingectomy this morning 2/20.   R/B/A rediscussed and all ?S answered.     awestfall

## 2019-02-20 NOTE — CARE PLAN
Problem: Safety  Goal: Will remain free from injury  Outcome: PROGRESSING AS EXPECTED  Non skid socks in use. Call light and personal belongings within reach. Assistance provided with ambulation.     Problem: Bowel/Gastric:  Goal: Normal bowel function is maintained or improved  Outcome: PROGRESSING AS EXPECTED  Pt medicated with anti emetic. +bowel sounds upon ausculation. Encouraging PO intake. IVF running. Pt on regular diet until midnight.    Problem: Pain Management  Goal: Pain level will decrease to patient's comfort goal  Outcome: PROGRESSING AS EXPECTED  Heat packs provided for pain. MD at bedside for pain med orders. Extra pillows in use for comfort.

## 2019-02-20 NOTE — PROGRESS NOTES
Assumed care at 1845. Pt resting in bed. A&ox 4  Ambulating with standby assist  Able to eat some dinner. Denies n/v  3/4 units  PRBCs infusing at change of shift. 4/4 unit started at 2135  O2 on RA  NPO after midnight. Sx in AM  Voiding adequately  Call light within reach. Hourly rounding in place

## 2019-02-20 NOTE — PROGRESS NOTES
PACU report received.  Assessment complete.  A&O x 4. Patient calls appropriately.  Patient up with stand by assist.   Patient has 10/10 pain. PCA Morphine in place  Denies N&V. Tolerating clears diet.  Surgical midline with gauze and tape dressing is CDI, no drainage noted.  + void via puente, - flatus, - BM- since surgery.  Patient denies SOB.  Patient not in pleasant mood, will continue to monitor.  Review plan with of care with patient. Call light and personal belongings with in reach. Hourly rounding in place. All needs met at this time.

## 2019-02-20 NOTE — PROGRESS NOTES
Pt's hgb 9.2 this AM after 4 units of PRBCs the previous day. NPO after midnight. No complaints of pain and nausea. Pre op checklist done. Off with transport to pre-op at 0630.

## 2019-02-20 NOTE — OR NURSING
1710 Pt tolerating fluids, 2 units of RBC in, VS at baseline, awaiting cbc results, report called to transferring RN on [facility] Bisi RN.   1725 Lab results called to Dr. Adler. Dr. Adler will be into see pt after she finishes seeing pt's in the office. No new orders. Pt is AOX4, but tired. Pain is controlled, tolerating fluids, transport arrived and pt taken up by shantanu.

## 2019-02-20 NOTE — PROGRESS NOTES
Pt on unit.  Pt A&O x 4.   Reports 1/10 7/10 pain. Heat packs provided. Pain medication ordered. Dr. Adler at bedside.  Pt ambulated for gurney to bed 1x assist. Pt reporting dizziness.  +nause/vomitting. 250cc of pink emesis noted. Pt stating she ate crackers and ice cream downstairs. Pt medicated for anti emetic.  Hgb 7.6. 3 of 4 units of blood running at this time.  Admit profile complete. Pt refusing flu vaccine.  Pt on regular diet. NPO at midnight for surgery tomorrow morning.  POC discussed with pt. All needs addressed at this time.

## 2019-02-20 NOTE — OR NURSING
Pt resting with eyes closed, VSS, on 2LNC, O2Sat 100%. Dressing intact, no apparent distress. No complaints of nausea, tolerates sips of clears. Family updated, report called.

## 2019-02-20 NOTE — OR SURGEON
Immediate Post OP Note    PreOp Diagnosis: symptomatic uterine fibroids, menorrhagia, anemia, s/p 4 units PRBCs    PostOp Diagnosis: same    Procedure(s):  ABDOMINAL HYSTERECTOMY TOTAL - Wound Class: Clean Contaminated  SALPINGECTOMY - Wound Class: Clean Contaminated    Surgeon(s):  MANISH Kiarn M.D.    Anesthesiologist/Type of Anesthesia:  Anesthesiologist: Darian Lucio M.D./General    Surgical Staff:  Circulator: Houston Hernandez R.N.  Relief Circulator: Sienna Akers R.N.  Scrub Person: Rima Robertson    Specimens removed if any:  ID Type Source Tests Collected by Time Destination   A :  Tissue Uterus PATHOLOGY SPECIMEN Margarette Adler M.D. 2/20/2019  8:49 AM        Estimated Blood Loss: 500cc    Findings: enlarged fibroid uterus, normal ovaries    Complications: none        2/20/2019 9:51 AM Margarette Adler M.D.

## 2019-02-20 NOTE — OP REPORT
DATE OF SERVICE:  02/20/2019    PREOPERATIVE DIAGNOSES:  1.  Symptomatic uterine fibroids.  2.  Menorrhagia.  3.  Anemia status post 4 units packed red blood cell transfusion.    POSTOPERATIVE DIAGNOSES:  1.  Symptomatic uterine fibroids.  2.  Menorrhagia.  3.  Anemia status post 4 units packed red blood cell transfusion.    PROCEDURE:  Total abdominal hysterectomy with bilateral salpingectomy.    SURGEON:  Margarette Adler MD    ASSISTANT:  Denny Carranza MD    ANESTHESIOLOGIST:  Darian Lucio MD    ANESTHESIA:  General endotracheal anesthesia.    COMPLICATIONS:  None.    ESTIMATED BLOOD LOSS:  500 mL    URINE OUTPUT:  400 mL clear urine at the end of the procedure.    INTRAOPERATIVE FINDINGS:  Include a significantly enlarged, approximately 15   cm by estimate, uterus with multiple pedunculated subserosal fibroid,   intramural fibroids throughout the uterus.  Normal appearing tubes and ovaries   bilaterally.  Otherwise, normal pelvis with a small spot of endometriosis on   the uterus itself.    SPECIMENS:  Include uterus and cervix with attached bilateral fallopian tubes,   sent to pathology for confirmation evaluation.    DESCRIPTION OF PROCEDURE IN DETAIL:  After proper consent obtained, the   patient was taken to the operating room where general anesthesia was obtained   by Dr. Lucio without difficulty.  She was placed in a dorsal supine position.    She was then placed in a frog leg position.  Exam under anesthesia reveals a   significantly enlarged irregular, minimally mobile uterus that palpates up to   just below the umbilicus.  At this time, intraoperative decision was made to   make a midline vertical skin incision.  A sterile Horn catheter was placed by   nursing staff and she was prepped in a normal sterile fashion.  Reprepped and   placed in dorsal supine position.  After the appropriate time interval, she   was then draped in normal sterile fashion.  At this time, a midline vertical   skin  incision was made with scalpel and was carried down to the underlying   layer of fascia with electrocautery.  The fascia was nicked sharply with   electrocautery and the fascial incision extended inferior and superiorly with   the use of Pean and electrocautery.  The left lateral aspect of the fascial   incision was grasped with Kochers x2, elevated, and the rectus muscles were    off bluntly and with electrocautery.  The rectus muscles were then    in the midline bluntly.  The peritoneum was identified, grasped and   entered sharply and the peritoneal incision extended inferiorly and superiorly   with good visualization of bladder.  At that time, exploration of the pelvis   revealed a significantly enlarged uterus with multiple palpable fibroids in   the fundal, mid ____ body, and on the right side near the cervix.  There were   some omental adhesions to the anterior left portion of the uterus where there   was a pedunculated fibroid.  Therefore, the omentum was clamped and transected   and removed from the uterine wall and tied with a free tie for hemostasis.    At this time, we are able to deliver the enlarged uterus through the abdominal   incision and at this time, the right round ligament was doubly clamped,   transected, free tied suture ligature, and this was then performed again on   the left side and this time, the peritoneum  and the anterior   peritoneum was dissected off carefully with electrocautery, creating a bladder   flap from both sides meeting in the midline and the bladder was dissected off   the lower uterine segment, combination of blunt and sharp dissection into   until the bladder seems to be clear of the lower uterine segment.  At this   time, the posterior peritoneum was dissected back to reveal the uterosacral   ligaments.  The fallopian tubes bilaterally are grasped with Lynette clamps,   elevated, and using a handheld LigaSure, the tubes are excised remaining    attached to the uterine body.  At this time, the uteroovarian ligament and   vessels are identified.  Electrocautery and fingers were used to create a   window and the ligament and vessels were then doubly clamped, transected, free   tied, and suture ligated to remove the connection between the ovary and the   uterus.  This dissection was then carried down as the uterine arteries are   skeletonized.  This procedure was performed bilaterally, clamping close to the   uterus, transecting, and suture ligating until we are down to the level of   the uterine arteries.  Uterine arteries were skeletonized.  The uterine   arteries bilaterally are clamped, transected, and suture ligated.  This   dissection, clamped, cut, and ligation continued down until we feel we are at   the level of the cervix.  On the right side, there was a large fibroid in this   area, but we are able to maneuver this all well, confirming and maintaining   that the bladder was dissected off the lower uterine segment and clear of the   area of dissection.  Large curved Eliel clamps were then placed below the   cervix across the top of the vagina meeting in the midline and the uterus and   cervix were amputated with Therese scissors.  The vaginal cuff angles are   suture ligated with a figure-of-eight suture and the vaginal cuff was closed   with a running 0 Vicryl suture, again with care to make sure the bladder was   dissected off this area.  Please note, prior to complete amputation of the   uterus, the uterosacral ligaments were also doubly clamped in the similar   fashion, clamped, transected, and suture ligated prior to the removal of the   uterus.  Vaginal cuff was then closed and the angles including the uterosacral   ligaments for support.  At this time, the vaginal cuff was closed.  All   pedicles were examined and appeared to be hemostatic.  The pelvis was   irrigated with warm water and visualized to confirm no bleeding from the    vaginal cuff or any pedicles.  Copious suction irrigation performed.  Please   note, during the dissection of the uterus and clamping of vessels, broad   ligament and uterosacrals, a malleable was used as well as two wet lap sponges   to pack away the bowel to avoid any damage to the bowel during this   dissection.  At this time, the uterus was removed, all pedicles were   hemostatic.  There was no evidence or concern that the ureters have been   compromised as the pelvic sidewalls are visualized and are intact.  The   bladder was examined and appears to be intact.  The Horn catheter was checked   with clear urine in the Horn tubing.  At this time, all instruments and   laparotomy sponges are removed.  The peritoneum was grasped with Abigail clamps.    Peritoneum was closed with running 3-0 Vicryl suture.  The fascia was then   closed with running 0 Vicryl suture and manual palpation confirms thorough and   adequate closure of the fascial layer.  Copious irrigation was again used.    Rectus muscle belly was examined and found to be hemostatic, intact, and well   approximated in the midline.  Subcutaneous tissue was hemostatic and the   subcutaneous tissue was reapproximated with a running 3-0 Vicryl suture and   the skin incisions closed with staples.  At this time, sponge, lap, needle,   and instrument counts were correct x3.  Estimated blood loss appears to be   approximately 500 mL, not including the amount of blood in the significantly   engorged uterus.  Again, Horn catheter was checked, clear urine in the Horn   tubing.  At this time, the procedure was ended and the patient was taken to   recovery room awake and in stable condition.  She will be admitted for   post-surgical care.       ____________________________________     MD MIRANDA Valle / CARLOTTA    DD:  02/20/2019 11:54:37  DT:  02/20/2019 15:04:08    D#:  [id number]  Job#:  [id number]    cc: Denny Carranza MD

## 2019-02-21 LAB
ANION GAP SERPL CALC-SCNC: 4 MMOL/L (ref 0–11.9)
BASOPHILS # BLD AUTO: 0.4 % (ref 0–1.8)
BASOPHILS # BLD: 0.03 K/UL (ref 0–0.12)
BUN SERPL-MCNC: 6 MG/DL (ref 8–22)
CALCIUM SERPL-MCNC: 8.1 MG/DL (ref 8.5–10.5)
CHLORIDE SERPL-SCNC: 109 MMOL/L (ref 96–112)
CO2 SERPL-SCNC: 23 MMOL/L (ref 20–33)
CREAT SERPL-MCNC: 0.7 MG/DL (ref 0.5–1.4)
EOSINOPHIL # BLD AUTO: 0.06 K/UL (ref 0–0.51)
EOSINOPHIL NFR BLD: 0.8 % (ref 0–6.9)
ERYTHROCYTE [DISTWIDTH] IN BLOOD BY AUTOMATED COUNT: 74.3 FL (ref 35.9–50)
GLUCOSE SERPL-MCNC: 118 MG/DL (ref 65–99)
HCT VFR BLD AUTO: 26.9 % (ref 37–47)
HGB BLD-MCNC: 7.9 G/DL (ref 12–16)
IMM GRANULOCYTES # BLD AUTO: 0.03 K/UL (ref 0–0.11)
IMM GRANULOCYTES NFR BLD AUTO: 0.4 % (ref 0–0.9)
LYMPHOCYTES # BLD AUTO: 1.6 K/UL (ref 1–4.8)
LYMPHOCYTES NFR BLD: 21.9 % (ref 22–41)
MCH RBC QN AUTO: 24.4 PG (ref 27–33)
MCHC RBC AUTO-ENTMCNC: 29.4 G/DL (ref 33.6–35)
MCV RBC AUTO: 83 FL (ref 81.4–97.8)
MONOCYTES # BLD AUTO: 0.42 K/UL (ref 0–0.85)
MONOCYTES NFR BLD AUTO: 5.8 % (ref 0–13.4)
NEUTROPHILS # BLD AUTO: 5.16 K/UL (ref 2–7.15)
NEUTROPHILS NFR BLD: 70.7 % (ref 44–72)
NRBC # BLD AUTO: 0 K/UL
NRBC BLD-RTO: 0 /100 WBC
PLATELET # BLD AUTO: 229 K/UL (ref 164–446)
PMV BLD AUTO: 8.9 FL (ref 9–12.9)
POTASSIUM SERPL-SCNC: 4 MMOL/L (ref 3.6–5.5)
RBC # BLD AUTO: 3.24 M/UL (ref 4.2–5.4)
SODIUM SERPL-SCNC: 136 MMOL/L (ref 135–145)
WBC # BLD AUTO: 7.3 K/UL (ref 4.8–10.8)

## 2019-02-21 PROCEDURE — 80048 BASIC METABOLIC PNL TOTAL CA: CPT

## 2019-02-21 PROCEDURE — 85025 COMPLETE CBC W/AUTO DIFF WBC: CPT

## 2019-02-21 PROCEDURE — 700112 HCHG RX REV CODE 229: Performed by: OBSTETRICS & GYNECOLOGY

## 2019-02-21 PROCEDURE — 770006 HCHG ROOM/CARE - MED/SURG/GYN SEMI*

## 2019-02-21 PROCEDURE — 700111 HCHG RX REV CODE 636 W/ 250 OVERRIDE (IP): Performed by: OBSTETRICS & GYNECOLOGY

## 2019-02-21 PROCEDURE — 700102 HCHG RX REV CODE 250 W/ 637 OVERRIDE(OP): Performed by: OBSTETRICS & GYNECOLOGY

## 2019-02-21 PROCEDURE — A9270 NON-COVERED ITEM OR SERVICE: HCPCS | Performed by: OBSTETRICS & GYNECOLOGY

## 2019-02-21 PROCEDURE — 36415 COLL VENOUS BLD VENIPUNCTURE: CPT

## 2019-02-21 PROCEDURE — 700101 HCHG RX REV CODE 250: Performed by: OBSTETRICS & GYNECOLOGY

## 2019-02-21 RX ORDER — OXYCODONE HYDROCHLORIDE 5 MG/1
5 TABLET ORAL EVERY 4 HOURS PRN
Status: DISCONTINUED | OUTPATIENT
Start: 2019-02-21 | End: 2019-02-23 | Stop reason: HOSPADM

## 2019-02-21 RX ORDER — DOCUSATE SODIUM 100 MG/1
100 CAPSULE, LIQUID FILLED ORAL 2 TIMES DAILY
Status: DISCONTINUED | OUTPATIENT
Start: 2019-02-21 | End: 2019-02-23 | Stop reason: HOSPADM

## 2019-02-21 RX ORDER — OXYCODONE HYDROCHLORIDE 10 MG/1
10 TABLET ORAL EVERY 4 HOURS PRN
Status: DISCONTINUED | OUTPATIENT
Start: 2019-02-21 | End: 2019-02-23 | Stop reason: HOSPADM

## 2019-02-21 RX ORDER — MORPHINE SULFATE 4 MG/ML
4 INJECTION, SOLUTION INTRAMUSCULAR; INTRAVENOUS
Status: DISCONTINUED | OUTPATIENT
Start: 2019-02-21 | End: 2019-02-23 | Stop reason: HOSPADM

## 2019-02-21 RX ADMIN — MORPHINE SULFATE: 50 INJECTION, SOLUTION, CONCENTRATE INTRAVENOUS at 06:31

## 2019-02-21 RX ADMIN — ACETAMINOPHEN 1000 MG: 500 TABLET ORAL at 17:28

## 2019-02-21 RX ADMIN — POTASSIUM CHLORIDE, DEXTROSE MONOHYDRATE AND SODIUM CHLORIDE: 150; 5; 450 INJECTION, SOLUTION INTRAVENOUS at 17:41

## 2019-02-21 RX ADMIN — KETOROLAC TROMETHAMINE 30 MG: 30 INJECTION, SOLUTION INTRAMUSCULAR at 06:41

## 2019-02-21 RX ADMIN — ACETAMINOPHEN 1000 MG: 500 TABLET ORAL at 12:27

## 2019-02-21 RX ADMIN — ACETAMINOPHEN 1000 MG: 500 TABLET ORAL at 00:32

## 2019-02-21 RX ADMIN — POTASSIUM CHLORIDE, DEXTROSE MONOHYDRATE AND SODIUM CHLORIDE: 150; 5; 450 INJECTION, SOLUTION INTRAVENOUS at 10:30

## 2019-02-21 RX ADMIN — FAMOTIDINE 20 MG: 20 TABLET ORAL at 06:40

## 2019-02-21 RX ADMIN — DOCUSATE SODIUM 100 MG: 100 CAPSULE, LIQUID FILLED ORAL at 17:28

## 2019-02-21 RX ADMIN — ONDANSETRON 4 MG: 2 INJECTION INTRAMUSCULAR; INTRAVENOUS at 16:23

## 2019-02-21 RX ADMIN — FAMOTIDINE 20 MG: 20 TABLET ORAL at 17:28

## 2019-02-21 RX ADMIN — OXYCODONE HYDROCHLORIDE 10 MG: 10 TABLET ORAL at 15:18

## 2019-02-21 RX ADMIN — ACETAMINOPHEN 1000 MG: 500 TABLET ORAL at 06:41

## 2019-02-21 RX ADMIN — KETOROLAC TROMETHAMINE 30 MG: 30 INJECTION, SOLUTION INTRAMUSCULAR at 00:32

## 2019-02-21 RX ADMIN — MORPHINE SULFATE 4 MG: 4 INJECTION INTRAVENOUS at 17:28

## 2019-02-21 RX ADMIN — KETOROLAC TROMETHAMINE 30 MG: 30 INJECTION, SOLUTION INTRAMUSCULAR at 12:27

## 2019-02-21 RX ADMIN — KETOROLAC TROMETHAMINE 30 MG: 30 INJECTION, SOLUTION INTRAMUSCULAR at 17:28

## 2019-02-21 RX ADMIN — POTASSIUM CHLORIDE, DEXTROSE MONOHYDRATE AND SODIUM CHLORIDE: 150; 5; 450 INJECTION, SOLUTION INTRAVENOUS at 02:52

## 2019-02-21 NOTE — CARE PLAN
Problem: Safety  Goal: Will remain free from injury  Updated about POC. Reinforce call light  Use. Pt acknowledged understanding    Problem: Venous Thromboembolism (VTW)/Deep Vein Thrombosis (DVT) Prevention:  Goal: Patient will participate in Venous Thrombosis (VTE)/Deep Vein Thrombosis (DVT)Prevention Measures  SCDs in place    Problem: Bowel/Gastric:  Goal: Normal bowel function is maintained or improved  Encouraged ambulation. Denies flatus yet. Hypoactive BS

## 2019-02-21 NOTE — PROGRESS NOTES
Post op Note/GYN  S- pt feeling ok. States pain  well controlled with PCA. Denies nausea. Puente is irritating but tolerable. Dsicussed surgery and findings and ?s answered.   O- afebrile VSS Bp elevated slightly (likely pain related)  Abdomen: soft; dressing intact  GM=6090 cc since arriving to floor (about 4 hours)  A- S/P VIRGINIE  P- doing well  Continue routine post op care  D/C puente in am  Continue liquid diet until positive bowel sounds    awestfall

## 2019-02-21 NOTE — PROGRESS NOTES
"POD # 1 S/P VIRGINIE  S- doing well, pain well controlled on PCA, tolerating full liquids, ambulating with assistance, feels ready to advance diet and increase ambulation  O- afebrile Durable Medical Equipment-Specific Vitals  Vitals  Blood Pressure: 111/72  Temperature: 36 °C (96.8 °F)  Temp src: Temporal  Pulse: (!) 59  Respiration: 16  Pulse Oximetry: 94 %  Height: 165.1 cm (5' 5\")  Weight: 86.8 kg (191 lb 5.8 oz)  DME  O2 (LPM): 2  O2 Delivery: Nasal Cannula    Intake/Output Summary (Last 24 hours) at 02/21/19 1257  Last data filed at 02/21/19 1201   Gross per 24 hour   Intake           2252.5 ml   Output             2700 ml   Net           -447.5 ml   Has had 800 cc out in past 4 hours    Abdomen: soft, nondistended, +BS  Incision: c/d/i without erythema or edema    Recent Labs      02/19/19   1631  02/20/19   0155  02/21/19   0356   WBC  5.5  5.4  7.3   RBC  3.25*  3.70*  3.24*   HEMOGLOBIN  7.6*  9.2*  7.9*   HEMATOCRIT  25.9*  30.0*  26.9*   MCV  79.7*  81.1*  83.0   MCH  23.1*  24.9*  24.4*   RDW   --   72.8*  74.3*   PLATELETCT  285  256  229   MPV  9.4  9.1  8.9*   NEUTSPOLYS   --   59.40  70.70   LYMPHOCYTES   --   30.20  21.90*   MONOCYTES   --   7.60  5.80   EOSINOPHILS   --   1.70  0.80   BASOPHILS   --   0.90  0.40   RBCMORPHOLO  Present   --    --      A- S/P VIRGINIE doing well  P- D/C/PCA and start po pain meds with IV for breakthru  D/C puente  Stool softener  Advance diet to soft then regular  Anticipate D/C home tomorrow    awestfall      "

## 2019-02-21 NOTE — PROGRESS NOTES
Assumed care at 1845. Pt resting in bed. A&ox 4  Not ambulated yet. Refusing for now. Will encouraged to ambulate before bedtime  O2 on Ra.   abd midline dressing CDI  Horn in place. Will dc in AM  Tolerating liq diet. Hypoactive BS. Denies flatus yet  Pain controlled with morphine PCA  Call light within reach. Hourly rounding in place

## 2019-02-21 NOTE — PROGRESS NOTES
Bedside report received.  Assessment complete.  A&O x 4. Patient calls appropriately.  Patient up with stand by assist.   Patient has 4/10 pain. Morphine PCA in place  Denies N&V. Tolerating full liquid diet.  Surgical midline is MIKI with staples, CDI.  + void via puente- will dc today, - flatus, - BM.  Patient denies SOB.  Patient in pleasant mood, will hopefully DC tomorrow.  Review plan with of care with patient. Call light and personal belongings with in reach. Hourly rounding in place. All needs met at this time.

## 2019-02-22 PROCEDURE — A9270 NON-COVERED ITEM OR SERVICE: HCPCS | Performed by: OBSTETRICS & GYNECOLOGY

## 2019-02-22 PROCEDURE — 770006 HCHG ROOM/CARE - MED/SURG/GYN SEMI*

## 2019-02-22 PROCEDURE — 700102 HCHG RX REV CODE 250 W/ 637 OVERRIDE(OP): Performed by: OBSTETRICS & GYNECOLOGY

## 2019-02-22 PROCEDURE — 700112 HCHG RX REV CODE 229: Performed by: OBSTETRICS & GYNECOLOGY

## 2019-02-22 RX ORDER — BISACODYL 10 MG
10 SUPPOSITORY, RECTAL RECTAL DAILY
Status: DISCONTINUED | OUTPATIENT
Start: 2019-02-22 | End: 2019-02-23 | Stop reason: HOSPADM

## 2019-02-22 RX ADMIN — OXYCODONE HYDROCHLORIDE 10 MG: 10 TABLET ORAL at 17:05

## 2019-02-22 RX ADMIN — BISACODYL 10 MG: 10 SUPPOSITORY RECTAL at 13:34

## 2019-02-22 RX ADMIN — OXYCODONE HYDROCHLORIDE 10 MG: 10 TABLET ORAL at 10:36

## 2019-02-22 RX ADMIN — FAMOTIDINE 20 MG: 20 TABLET ORAL at 06:20

## 2019-02-22 RX ADMIN — OXYCODONE HYDROCHLORIDE 10 MG: 10 TABLET ORAL at 21:05

## 2019-02-22 RX ADMIN — DOCUSATE SODIUM 100 MG: 100 CAPSULE, LIQUID FILLED ORAL at 17:02

## 2019-02-22 RX ADMIN — FAMOTIDINE 20 MG: 20 TABLET ORAL at 17:02

## 2019-02-22 RX ADMIN — ACETAMINOPHEN 1000 MG: 500 TABLET ORAL at 06:20

## 2019-02-22 RX ADMIN — OXYCODONE HYDROCHLORIDE 5 MG: 5 TABLET ORAL at 06:20

## 2019-02-22 RX ADMIN — ACETAMINOPHEN 1000 MG: 500 TABLET ORAL at 17:02

## 2019-02-22 RX ADMIN — ACETAMINOPHEN 1000 MG: 500 TABLET ORAL at 00:00

## 2019-02-22 RX ADMIN — ACETAMINOPHEN 1000 MG: 500 TABLET ORAL at 11:35

## 2019-02-22 RX ADMIN — DOCUSATE SODIUM 100 MG: 100 CAPSULE, LIQUID FILLED ORAL at 06:20

## 2019-02-22 RX ADMIN — ACETAMINOPHEN 1000 MG: 500 TABLET ORAL at 23:44

## 2019-02-22 NOTE — PROGRESS NOTES
POD # 2 S/P VIRGINIE  S- doing well, ambulating, voiding, suzanne soft diet but emesis yesterday, no BM and small flatus yesterday- she is worried mostly about the diet and tolerance issues; pain moderatley well controlled with po meds  O-   Vitals  Blood Pressure: 144/86  Temperature: 36.4 °C (97.5 °F)  Temp src: Temporal  Pulse: 81  Respiration: 16  Pulse Oximetry: 96 %    Abdomen: soft, non-distended; + bowel sounds; incision c/d/i no erythema or edema  Ext: trace edema    Pathology= benign (1250 grams); discussed with patient    A-  S/P VIRGINIE doing well    P- D/V IV  Dulcolax suppository today  Plan D/C home tomorrow am  check CBC in am    awestfall

## 2019-02-22 NOTE — CARE PLAN
Problem: Safety  Goal: Will remain free from injury  Updated about POC. Reinforce call light  Use. Pt acknowledged understanding     Problem: Venous Thromboembolism (VTW)/Deep Vein Thrombosis (DVT) Prevention:  Goal: Patient will participate in Venous Thrombosis (VTE)/Deep Vein Thrombosis (DVT)Prevention Measures  SCDs in place     Problem: Bowel/Gastric:  Goal: Normal bowel function is maintained or improved  Encouraged ambulation. Up in hallways. Some flatus. No bm yet

## 2019-02-22 NOTE — PROGRESS NOTES
Bedside report received.  Assessment complete.  A&O x 4. Patient calls appropriately.  Patient up with stand by assist.   Patient has 0/10 pain. Declines medication at this time  Denies N&V. Tolerating full liquid diet, advanced to GI soft.  Surgical midline with staples is MIKI, CDI, no redness or swelling present.  + void, - flatus, - BM.  Patient denies SOB.  SCD's on.  Patient in pleasant mood, complaints of swelling, asked for IV to be discontinued overnight, refusing new IV at this time, will update MD on rounds.  Review plan with of care with patient. Call light and personal belongings with in reach. Hourly rounding in place. All needs met at this time.

## 2019-02-22 NOTE — PROGRESS NOTES
Assumed care at 1845. Pt resting in bed. A&ox 4  Ambulated in halls with standby assist  abd staples MIKI CDI  Voiding adequately  Some flatus. No bm yet  Tolerating full liq diet for now. Will advance to soft in AM  O2 on RA.  No vaginal bleeding  Pain controlled with oxycodone for now  Lost iv access. Fluids SL. Drinking adequately.   Pt refusing for another IV start for now. No medication needed. Will reassess and start when when needed. Hoping to dc in AM.   Call light within reach. Hourly rounding in place

## 2019-02-23 VITALS
SYSTOLIC BLOOD PRESSURE: 142 MMHG | RESPIRATION RATE: 18 BRPM | DIASTOLIC BLOOD PRESSURE: 89 MMHG | BODY MASS INDEX: 33.06 KG/M2 | WEIGHT: 198.41 LBS | TEMPERATURE: 97.5 F | HEIGHT: 65 IN | HEART RATE: 66 BPM | OXYGEN SATURATION: 100 %

## 2019-02-23 PROBLEM — G89.18 POST-OP PAIN: Status: ACTIVE | Noted: 2019-02-23

## 2019-02-23 LAB
BASOPHILS # BLD AUTO: 0.7 % (ref 0–1.8)
BASOPHILS # BLD: 0.04 K/UL (ref 0–0.12)
EOSINOPHIL # BLD AUTO: 0.21 K/UL (ref 0–0.51)
EOSINOPHIL NFR BLD: 3.9 % (ref 0–6.9)
ERYTHROCYTE [DISTWIDTH] IN BLOOD BY AUTOMATED COUNT: 67.4 FL (ref 35.9–50)
HCT VFR BLD AUTO: 28.5 % (ref 37–47)
HGB BLD-MCNC: 8.3 G/DL (ref 12–16)
IMM GRANULOCYTES # BLD AUTO: 0.02 K/UL (ref 0–0.11)
IMM GRANULOCYTES NFR BLD AUTO: 0.4 % (ref 0–0.9)
LYMPHOCYTES # BLD AUTO: 0.93 K/UL (ref 1–4.8)
LYMPHOCYTES NFR BLD: 17.3 % (ref 22–41)
MCH RBC QN AUTO: 23.5 PG (ref 27–33)
MCHC RBC AUTO-ENTMCNC: 29.1 G/DL (ref 33.6–35)
MCV RBC AUTO: 80.7 FL (ref 81.4–97.8)
MONOCYTES # BLD AUTO: 0.36 K/UL (ref 0–0.85)
MONOCYTES NFR BLD AUTO: 6.7 % (ref 0–13.4)
NEUTROPHILS # BLD AUTO: 3.83 K/UL (ref 2–7.15)
NEUTROPHILS NFR BLD: 71 % (ref 44–72)
NRBC # BLD AUTO: 0 K/UL
NRBC BLD-RTO: 0 /100 WBC
PLATELET # BLD AUTO: 267 K/UL (ref 164–446)
PMV BLD AUTO: 9.1 FL (ref 9–12.9)
RBC # BLD AUTO: 3.53 M/UL (ref 4.2–5.4)
WBC # BLD AUTO: 5.4 K/UL (ref 4.8–10.8)

## 2019-02-23 PROCEDURE — A9270 NON-COVERED ITEM OR SERVICE: HCPCS | Performed by: OBSTETRICS & GYNECOLOGY

## 2019-02-23 PROCEDURE — 36415 COLL VENOUS BLD VENIPUNCTURE: CPT

## 2019-02-23 PROCEDURE — 700102 HCHG RX REV CODE 250 W/ 637 OVERRIDE(OP): Performed by: OBSTETRICS & GYNECOLOGY

## 2019-02-23 PROCEDURE — 85025 COMPLETE CBC W/AUTO DIFF WBC: CPT

## 2019-02-23 PROCEDURE — 700112 HCHG RX REV CODE 229: Performed by: OBSTETRICS & GYNECOLOGY

## 2019-02-23 RX ORDER — BISACODYL 10 MG
10 SUPPOSITORY, RECTAL RECTAL DAILY
Qty: 10 SUPPOSITORY | Refills: 1 | Status: SHIPPED | OUTPATIENT
Start: 2019-02-24 | End: 2019-07-10

## 2019-02-23 RX ORDER — OXYCODONE HYDROCHLORIDE 5 MG/1
5 TABLET ORAL EVERY 4 HOURS PRN
Qty: 30 TAB | Refills: 0 | Status: SHIPPED | OUTPATIENT
Start: 2019-02-23 | End: 2019-03-02

## 2019-02-23 RX ORDER — PSEUDOEPHEDRINE HCL 30 MG
100 TABLET ORAL 2 TIMES DAILY
Qty: 60 CAP | Refills: 2 | Status: SHIPPED | OUTPATIENT
Start: 2019-02-23 | End: 2019-07-10

## 2019-02-23 RX ADMIN — BISACODYL 10 MG: 10 SUPPOSITORY RECTAL at 05:25

## 2019-02-23 RX ADMIN — DOCUSATE SODIUM 100 MG: 100 CAPSULE, LIQUID FILLED ORAL at 05:24

## 2019-02-23 RX ADMIN — OXYCODONE HYDROCHLORIDE 10 MG: 10 TABLET ORAL at 02:36

## 2019-02-23 RX ADMIN — ACETAMINOPHEN 1000 MG: 500 TABLET ORAL at 05:24

## 2019-02-23 RX ADMIN — FAMOTIDINE 20 MG: 20 TABLET ORAL at 05:24

## 2019-02-23 RX ADMIN — OXYCODONE HYDROCHLORIDE 10 MG: 10 TABLET ORAL at 09:13

## 2019-02-23 NOTE — CARE PLAN
Problem: Safety  Goal: Will remain free from falls  Outcome: PROGRESSING AS EXPECTED  Bed is locked and in lowest position. Non slid slipper in place.   Call light and personal belongings are within reach     Problem: Pain Management  Goal: Pain level will decrease to patient's comfort goal  Outcome: PROGRESSING AS EXPECTED  Will encourage pt to ambulate, Will medicate per MAR, and will provide non-pharmacologic pain relief measures.

## 2019-02-23 NOTE — DISCHARGE INSTRUCTIONS
Follow up my office Wednesday for staple removal   No lifting or driving   Pelvic rest   Soft diet   Rx for oxycodone, colace, dulcolax   Use OTC motrin 600mg every 6 hours as needed    Discharge Instructions    Discharged to home by car with relative. Discharged via wheelchair, hospital escort: Yes.  Special equipment needed: Not Applicable    Be sure to schedule a follow-up appointment with your primary care doctor or any specialists as instructed.     Discharge Plan:   Influenza Vaccine Indication: Patient Refuses    I understand that a diet low in cholesterol, fat, and sodium is recommended for good health. Unless I have been given specific instructions below for another diet, I accept this instruction as my diet prescription.   Other diet: per MD order    Special Instructions: None    · Is patient discharged on Warfarin / Coumadin?   No     Depression / Suicide Risk    As you are discharged from this [facility] facility, it is important to learn how to keep safe from harming yourself.    Recognize the warning signs:  · Abrupt changes in personality, positive or negative- including increase in energy   · Giving away possessions  · Change in eating patterns- significant weight changes-  positive or negative  · Change in sleeping patterns- unable to sleep or sleeping all the time   · Unwillingness or inability to communicate  · Depression  · Unusual sadness, discouragement and loneliness  · Talk of wanting to die  · Neglect of personal appearance   · Rebelliousness- reckless behavior  · Withdrawal from people/activities they love  · Confusion- inability to concentrate     If you or a loved one observes any of these behaviors or has concerns about self-harm, here's what you can do:  · Talk about it- your feelings and reasons for harming yourself  · Remove any means that you might use to hurt yourself (examples: pills, rope, extension cords, firearm)  · Get professional help from the community (Mental Health,  Substance Abuse, psychological counseling)  · Do not be alone:Call your Safe Contact- someone whom you trust who will be there for you.  · Call your local CRISIS HOTLINE 342-6655 or 549-664-9669  · Call your local Children's Mobile Crisis Response Team [place] (242) 468-6931 or www.[employer]  · Call the toll free National Suicide Prevention Hotlines   · National Suicide Prevention Lifeline 199-310-ZOVQ (2830)  · [place] [facility] Line Network 800-SUICIDE (082-7586)      Incision Care, Adult  An incision is a cut that a doctor makes in your skin for surgery (for a procedure). Most times, these cuts are closed after surgery. Your cut from surgery may be closed with stitches (sutures), staples, skin glue, or skin tape (adhesive strips). You may need to return to your doctor to have stitches or staples taken out. This may happen many days or many weeks after your surgery. The cut needs to be well cared for so it does not get infected.  How to care for your cut  Cut care  · Follow instructions from your doctor about how to take care of your cut. Make sure you:  ¨ Wash your hands with soap and water before you change your bandage (dressing). If you cannot use soap and water, use hand .  ¨ Change your bandage as told by your doctor.  ¨ Leave stitches, skin glue, or skin tape in place. They may need to stay in place for 2 weeks or longer. If tape strips get loose and curl up, you may trim the loose edges. Do not remove tape strips completely unless your doctor says it is okay.  · Check your cut area every day for signs of infection. Check for:  ¨ More redness, swelling, or pain.  ¨ More fluid or blood.  ¨ Warmth.  ¨ Pus or a bad smell.  · Ask your doctor how to clean the cut. This may include:  ¨ Using mild soap and water.  ¨ Using a clean towel to pat the cut dry after you clean it.  ¨ Putting a cream or ointment on the cut. Do this only as told by your doctor.  ¨ Covering the cut with a clean  bandage.  · Ask your doctor when you can leave the cut uncovered.  · Do not take baths, swim, or use a hot tub until your doctor says it is okay. Ask your doctor if you can take showers. You may only be allowed to take sponge baths for bathing.  Medicines  · If you were prescribed an antibiotic medicine, cream, or ointment, take the antibiotic or put it on the cut as told by your doctor. Do not stop taking or putting on the antibiotic even if your condition gets better.  · Take over-the-counter and prescription medicines only as told by your doctor.  General instructions  · Limit movement around your cut. This helps healing.  ¨ Avoid straining, lifting, or exercise for the first month, or for as long as told by your doctor.  ¨ Follow instructions from your doctor about going back to your normal activities.  ¨ Ask your doctor what activities are safe.  · Protect your cut from the sun when you are outside for the first 6 months, or for as long as told by your doctor. Put on sunscreen around the scar or cover up the scar.  · Keep all follow-up visits as told by your doctor. This is important.  Contact a doctor if:  · Your have more redness, swelling, or pain around the cut.  · You have more fluid or blood coming from the cut.  · Your cut feels warm to the touch.  · You have pus or a bad smell coming from the cut.  · You have a fever or shaking chills.  · You feel sick to your stomach (nauseous) or you throw up (vomit).  · You are dizzy.  · Your stitches or staples come undone.  Get help right away if:  · You have a red streak coming from your cut.  · Your cut bleeds through the bandage and the bleeding does not stop with gentle pressure.  · The edges of your cut open up and separate.  · You have very bad (severe) pain.  · You have a rash.  · You are confused.  · You pass out (faint).  · You have trouble breathing and you have a fast heartbeat.  This information is not intended to replace advice given to you by your  health care provider. Make sure you discuss any questions you have with your health care provider.  Document Released: 03/11/2013 Document Revised: 08/25/2017 Document Reviewed: 08/25/2017  Elsevier Interactive Patient Education © 2017 [employer]

## 2019-02-23 NOTE — CARE PLAN
Problem: Safety  Goal: Will remain free from injury    Intervention: Provide assistance with mobility  Monitored pts baseline mobility       Problem: Venous Thromboembolism (VTW)/Deep Vein Thrombosis (DVT) Prevention:  Goal: Patient will participate in Venous Thrombosis (VTE)/Deep Vein Thrombosis (DVT)Prevention Measures    Intervention: Encourage patient to perform ankle flex, foot rotation, and knee flex exercises in addition to other prophylatic measures every hour while awake  Pt ambulating this am

## 2019-02-23 NOTE — DISCHARGE SUMMARY
DATE OF HOSPITAL ADMISSION:  02/19/2019    DATE OF DISCHARGE:  02/23/2019    DIAGNOSES ON ADMISSION:  1.  Symptomatic uterine fibroids.  2.  Severe symptomatic anemia.    DIAGNOSES ON DISCHARGE:  1.  Status post total abdominal hysterectomy and bilateral salpingectomy.  2.  Status post 4 units packed red blood cell transfusion.    HOSPITAL COURSE:  Patient was initially scheduled for abdominal hysterectomy   for symptomatic uterine fibroids on 02/19.  However, her labs in preop showed   a hemoglobin of 4.6; therefore, surgery was postponed.  She was admitted and   transfused with 4 units of packed red blood cells.  On the morning of the   20th, her hemoglobin was 9.2 with a hematocrit of 30 and at that time, a total   abdominal hysterectomy with bilateral salpingectomy was performed.  Procedure   was uncomplicated with approximately 500 mL blood loss.  Her postoperative   course, today is postop day #3.  At this time, she is tolerating a soft diet.    She is ambulating, voiding, passing flatus, had a small bowel movement after   use of suppository last night and again this morning.  Her pain is moderately   well controlled with oral oxycodone and Tylenol.  She is on stool softeners   and suppositories.  Her initial postop hemoglobin was 7.9 and this morning is   8.3 and 28.5.  She feels well.  Her swelling has decreased.    PHYSICAL EXAMINATION:  VITAL SIGNS:  She has been completely afebrile, vital signs stable, within   normal limits, mildly elevated blood pressures.  LUNGS:  Clear.  HEART:  Regular rate and rhythm.  ABDOMEN:  Soft, nondistended, normal postop tenderness, and positive bowel   sounds.  Incision is clean, dry, and intact without erythema or edema.    Pathology report shows a 1236 gram [id number] cm enlarged uterus with bilateral   fallopian tubes, all benign with cervix, no dysplasia; endometrium with benign   endometrial polyp; endometrium without atypia; myometrium with numerous   benign leiomyoma,  some of which were hyalinized and calcified; fallopian   tubes, bilateral fimbriated fallopian tubes with no significant abnormalities.    Therefore, on this day, postop day #3, the patient will be discharged home   in stable condition with the following instructions:    DISCHARGE INSTRUCTIONS:  1.  Staples will be removed in the office next week on Wednesday.  Patient to   call on Monday for appointment.  2.  Prescriptions will be given for oxycodone, Motrin, Colace, and Dulcolax   suppositories.  Please note that narcotic consent form was reviewed and signed   before the patient was admitted to my office, will be resigned today.  I have   checked her status on  as well as have an established relationship and   have deemed she is low risk for abuse.  3.  Pelvic rest.  4.  No lifting, no driving.  5.  Continue soft diet.  6.  Postoperative warning signs, concerns, complications, all discussed in   detail.  Questions answered and patient is comfortable with discharge home at   this time.       ____________________________________     MD MIRANDA Valle / CARLOTTA    DD:  02/23/2019 11:00:39  DT:  02/23/2019 11:37:04    D#:  [id number]  Job#:  [id number]

## 2019-02-23 NOTE — PROGRESS NOTES
Bedside report received.  Assessment complete.  A&O x 4. Patient calls appropriately.  Patient up with no assist. Bed alarm NI.   Patient has 7/10 pain. Medicated. Pain to abd.  Denies N&V. Tolerating low fiber GI soft diet.  Surgical incision to midline with staples, CDI.  + void, + flatus (minimal per pt), 2/23 BM, small.  Patient denies SOB.  Patient expected to DC today per report.  Review plan with of care with patient. Call light and personal belongings with in reach. Hourly rounding in place. All needs met at this time.

## 2019-02-23 NOTE — PROGRESS NOTES
POD # 3 s/p VIRGINIE  S- doing well. Ambulating. Voiding. Tolerating soft diet. +flatus. + small BM after suppository. Adequate pain control on po meds. Feels ready to go.   O- afebrile VSS &WNL  Abdomen: soft; nondistended, +BS  Incision c/d/i no erythema or edema  H&H- 8.3 & 28.5  A- s/p VIRGINIE doing well  P- D/C home    awestfall

## 2019-02-23 NOTE — PROGRESS NOTES
Bedside report received.  Assessment complete.  A&O x 4. Patient calls appropriately.  Patient up self.    Patient has 10/10 pain. Medicated per MAR  Denies N&V. Tolerating Low fiber GI soft diet.  Surgical midline incision is well approxiamted with staples and open to air, No overt signs of infection.  + void, + flatus, - BM.  Patient denies SOB.  SCD's on.  Patient pleasant with staff and in bed resting with family at bedside.  Review plan with of care with patient. Call light and personal belongings with in reach. Hourly rounding in place. All needs met at this time.

## 2019-07-10 ENCOUNTER — HOSPITAL ENCOUNTER (EMERGENCY)
Facility: MEDICAL CENTER | Age: 49
End: 2019-07-10
Attending: EMERGENCY MEDICINE
Payer: MEDICAID

## 2019-07-10 VITALS
HEIGHT: 65 IN | OXYGEN SATURATION: 99 % | RESPIRATION RATE: 16 BRPM | WEIGHT: 190.04 LBS | SYSTOLIC BLOOD PRESSURE: 139 MMHG | HEART RATE: 65 BPM | TEMPERATURE: 98.1 F | DIASTOLIC BLOOD PRESSURE: 87 MMHG | BODY MASS INDEX: 31.66 KG/M2
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
## 2019-07-10 PROCEDURE — 99283 EMERGENCY DEPT VISIT LOW MDM: CPT

## 2019-07-10 NOTE — ED PROVIDER NOTES
"ED Provider Note    CHIEF COMPLAINT  Chief Complaint   Patient presents with   • Dog Bite     patient c/o dog bite to right buttock x 2 days while walking through park. unknown if dog is up to date on shots.       HPI  Coby Francisco is a 48 y.o. female who presents with a dog bite.  Patient was at the park, and a Rottweiler was being beaten by its owner, and ended up running away, and came down on top of the patient.  She was bitten on the buttock.  She was able to get away without further injury.  She presents here to be checked out.  This bite was on Sunday.    PAST MEDICAL HISTORY  Past Medical History:   Diagnosis Date   • Uterine fibroid        FAMILY HISTORY  History reviewed. No pertinent family history.    SOCIAL HISTORY  Social History   Substance Use Topics   • Smoking status: Current Some Day Smoker     Types: Cigarettes   • Smokeless tobacco: Never Used   • Alcohol use Yes      Comment: occ         SURGICAL HISTORY  Past Surgical History:   Procedure Laterality Date   • ABDOMINAL HYSTERECTOMY TOTAL N/A 2/20/2019    Procedure: ABDOMINAL HYSTERECTOMY TOTAL;  Surgeon: Margarette Adler M.D.;  Location: SURGERY [facility];  Service: Gynecology   • SALPINGECTOMY Bilateral 2/20/2019    Procedure: SALPINGECTOMY;  Surgeon: Margarette Adler M.D.;  Location: SURGERY [facility];  Service: Gynecology       CURRENT MEDICATIONS  Home Medications     Reviewed by Ebenezer Marsh R.N. (Registered Nurse) on 07/10/19 at 1040  Med List Status: Complete   Medication Last Dose Status        Patient Jacob Taking any Medications                       I have reviewed the nurses notes and/or the list brought with the patient.    ALLERGIES  No Known Allergies    REVIEW OF SYSTEMS  See HPI for further details. Review of systems as above, dog bite    PHYSICAL EXAM  VITAL SIGNS: /83   Pulse 72   Temp 36 °C (96.8 °F) (Temporal)   Resp 18   Ht 1.651 m (5' 5\")   Wt 86.2 kg (190 lb 0.6 oz)   LMP " 01/14/2019   SpO2 100%   BMI 31.62 kg/m²     Constitutional: Patient is examined with nurse Trisha as chaperone.  Well appearing patient in no acute distress.  Not toxic, nor ill in appearance.  Extremities/Musculoskeletal: On the right buttock, there is a contusion with a few areas of superficial abrasion.  There is no erythema, warmth.  No tenderness.  No edema.        MEDICAL RECORD  I have reviewed patient's medical record and pertinent results are listed above.    COURSE & MEDICAL DECISION MAKING  I have reviewed any medical record information, laboratory studies and radiographic results as noted above.  This patient has a contusion of her buttock after being attacked by dog.  There is no evidence of infection, she does not need antibiotics for this, and given the appearance of the wound, I do not think she needs prophylactic antibiotics either.  No indication for rabies prophylaxis from description of the event combined with the historic incidence of rabies in [place] and dogs.  I do certainly want this to be followed up by animal control however.  Dog bite form is being given.  Instructions on contusion.    FINAL IMPRESSION  1. Dog bite, initial encounter    2.  Contusion secondary to dog bite       This dictation was created using voice recognition software.    Electronically signed by: Sunil Glass, 7/10/2019 11:12 AM

## 2019-07-10 NOTE — ED NOTES
Pt care assumed for discharge only.  Pt given d/c instructions and f/u info with verbal understanding.  Animal bite form has been faxed by primary RN.  VSS at discharge.  Pt ambulatory from the ED w/ steady gait.  All belongings in possession on discharge.  Pt escorted to the lobby by RN.

## 2019-07-10 NOTE — DISCHARGE INSTRUCTIONS
This wound should heal up without needing further intervention.  However, as we discussed, it is important that we follow through with animal control.  If those abrasions get more red, swollen or start draining, return here.

## 2019-07-10 NOTE — ED TRIAGE NOTES
"Emanuel Francisco  .  Chief Complaint   Patient presents with   • Dog Bite     patient c/o dog bite to right buttock x 2 days while walking through park. unknown if dog is up to date on shots.     Patient to triage with above complaint. .Blood Pressure: 134/83, Pulse: 72, Respiration: 18, Temperature: 36 °C (96.8 °F), Height: 165.1 cm (5' 5\"), Weight: 86.2 kg (190 lb 0.6 oz), Pulse Oximetry: 100 %    Patient to lobby and instructed to inform staff of any needs.  "

## 2019-11-07 ENCOUNTER — NON-PROVIDER VISIT (OUTPATIENT)
Dept: OCCUPATIONAL MEDICINE | Facility: CLINIC | Age: 49
End: 2019-11-07

## 2019-11-07 DIAGNOSIS — Z02.1 PRE-EMPLOYMENT DRUG SCREENING: ICD-10-CM

## 2019-11-07 LAB
AMP AMPHETAMINE: NORMAL
COC COCAINE: NORMAL
INT CON NEG: NORMAL
INT CON POS: NORMAL
MET METHAMPHETAMINES: NORMAL
OPI OPIATES: NORMAL
PCP PHENCYCLIDINE: NORMAL
POC DRUG COMMENT 753798-OCCUPATIONAL HEALTH: NORMAL
THC: NORMAL

## 2019-11-07 PROCEDURE — 80305 DRUG TEST PRSMV DIR OPT OBS: CPT | Performed by: NURSE PRACTITIONER

## (undated) DEVICE — SUTURE 3-0 VICRYL PLUS FS-1 27 (36PK/BX)"

## (undated) DEVICE — SUTURE GENERAL

## (undated) DEVICE — SHEET TRANSVERSE LAP - (12EA/CA)

## (undated) DEVICE — SLEEVE, VASO, THIGH, MED

## (undated) DEVICE — LACTATED RINGERS INJ 1000 ML - (14EA/CA 60CA/PF)

## (undated) DEVICE — KIT ROOM DECONTAMINATION

## (undated) DEVICE — TUBE CONNECT SUCTION CLEAR 120 X 1/4" (50EA/CA)"

## (undated) DEVICE — BOVIE NEEDLE TIP INSULATD NON-SAFETY 2CM (50/PK)

## (undated) DEVICE — TUBE E-T HI-LO CUFF 7.0MM (10EA/PK)

## (undated) DEVICE — SPONGE RADIOPAQUE CTN X-LG - STERILE (50PK/CA) MADE TO ORDER ITEM AND HAS A 4-6 WEEK LEAD TIME

## (undated) DEVICE — SPONGE KITTNER DISSECTORS - (5EA/PK 50PK/CA)

## (undated) DEVICE — GLOVE BIOGEL PI ORTHO SZ 7 PF LF (40PR/BX)

## (undated) DEVICE — TUBING CLEARLINK DUO-VENT - C-FLO (48EA/CA)

## (undated) DEVICE — SENSOR SPO2 NEO LNCS ADHESIVE (20/BX) SEE USER NOTES

## (undated) DEVICE — CANISTER SUCTION 3000ML MECHANICAL FILTER AUTO SHUTOFF MEDI-VAC NONSTERILE LF DISP  (40EA/CA)

## (undated) DEVICE — NEPTUNE 4 PORT MANIFOLD - (20/PK)

## (undated) DEVICE — DRESSING NON-ADHERING 8 X 3 - (50/BX)

## (undated) DEVICE — SET LEADWIRE 5 LEAD BEDSIDE DISPOSABLE ECG (1SET OF 5/EA)

## (undated) DEVICE — SUTURE 3-0 VICRYL PLUS CT-1 - 36 INCH (36/BX)

## (undated) DEVICE — ELECTRODE 850 FOAM ADHESIVE - HYDROGEL RADIOTRNSPRNT (50/PK)

## (undated) DEVICE — MASK ANESTHESIA ADULT  - (100/CA)

## (undated) DEVICE — SUTURE 1 VICRYL PLUSCT-1 27IN - (36/BX)

## (undated) DEVICE — GLOVE SZ 6.5 BIOGEL PI MICRO - PF LF (50PR/BX)

## (undated) DEVICE — LIGASURE TISSUE FUSION  - SINGLE USE (6/CA)

## (undated) DEVICE — HEAD HOLDER JUNIOR/ADULT

## (undated) DEVICE — GOWN WARMING STANDARD FLEX - (30/CA)

## (undated) DEVICE — HEMOSTAT ARISTA PWD 5 GRAM - (5/BX)

## (undated) DEVICE — STAPLER SKIN DISP - (6/BX 10BX/CA) VISISTAT

## (undated) DEVICE — BLADE SURGICAL #15 - (50/BX 3BX/CA)

## (undated) DEVICE — SUTURE 0 VICRYL PLUS CT-1 - 8 X 18 INCH (12/BX)

## (undated) DEVICE — SPONGE GAUZESTER 4 X 4 4PLY - (128PK/CA)

## (undated) DEVICE — PAD LAP STERILE 18 X 18 - (5/PK 40PK/CA)

## (undated) DEVICE — SUTURE 2-0 VICRYL PLUS CT-1 36 (36PK/BX)"

## (undated) DEVICE — TRAY CATHETER FOLEY URINE METER W/STATLOCK 350ML (10EA/CA)

## (undated) DEVICE — PROTECTOR ULNA NERVE - (36PR/CA)

## (undated) DEVICE — KIT ANESTHESIA W/CIRCUIT & 3/LT BAG W/FILTER (20EA/CA)

## (undated) DEVICE — GLOVE BIOGEL PI INDICATOR SZ 6.5 SURGICAL PF LF - (50/BX 4BX/CA)

## (undated) DEVICE — SUTURE 2-0 COATED VICRYL PLUS - 12 X 18 INCH (12/BX)

## (undated) DEVICE — GLOVE BIOGEL SZ 6.5 SURGICAL PF LTX (50PR/BX 4BX/CA)

## (undated) DEVICE — SUTURE 0 VICRYL PLUS CT-1 - 36 INCH (36/BX)

## (undated) DEVICE — DRAPE INCISE  INVISISHIELD 36 X 17.5 IN - (40/CA)

## (undated) DEVICE — SUTURE 3-0 MONOCRYL PLUS PS-1 - 27 INCH (36/BX)

## (undated) DEVICE — SUTURE 3-0 VICRYL PLUS - 12 X 18 INCH (12/BX)

## (undated) DEVICE — GLOVE, BIOGEL ECLIPSE, SZ 7.0, PF LTX (50/BX)

## (undated) DEVICE — LEGGING LITHOTOMY 31 X 48 IN - (2EA/PK 20PK/CA)

## (undated) DEVICE — SUCTION INSTRUMENT YANKAUER BULBOUS TIP W/O VENT (50EA/CA)